# Patient Record
Sex: MALE | Race: WHITE | Employment: FULL TIME
[De-identification: names, ages, dates, MRNs, and addresses within clinical notes are randomized per-mention and may not be internally consistent; named-entity substitution may affect disease eponyms.]

---

## 2019-08-28 ENCOUNTER — HOSPITAL ENCOUNTER (OUTPATIENT)
Dept: MRI IMAGING | Facility: CLINIC | Age: 49
Discharge: HOME | End: 2019-08-30
Payer: COMMERCIAL

## 2019-08-28 DIAGNOSIS — T14.8XXA SPRAIN: ICD-10-CM

## 2019-08-28 PROCEDURE — 73721 MRI JNT OF LWR EXTRE W/O DYE: CPT

## 2023-01-11 ENCOUNTER — HOSPITAL ENCOUNTER (EMERGENCY)
Age: 53
Discharge: HOME | End: 2023-01-11
Attending: STUDENT IN AN ORGANIZED HEALTH CARE EDUCATION/TRAINING PROGRAM
Payer: COMMERCIAL

## 2023-01-11 ENCOUNTER — APPOINTMENT (OUTPATIENT)
Dept: GENERAL RADIOLOGY | Age: 53
End: 2023-01-11
Payer: COMMERCIAL

## 2023-01-11 VITALS
BODY MASS INDEX: 42.66 KG/M2 | OXYGEN SATURATION: 100 % | RESPIRATION RATE: 24 BRPM | SYSTOLIC BLOOD PRESSURE: 117 MMHG | HEIGHT: 72 IN | HEART RATE: 83 BPM | TEMPERATURE: 97.9 F | DIASTOLIC BLOOD PRESSURE: 66 MMHG | WEIGHT: 315 LBS

## 2023-01-11 DIAGNOSIS — D64.9 ANEMIA, UNSPECIFIED TYPE: ICD-10-CM

## 2023-01-11 DIAGNOSIS — R06.00 DYSPNEA, UNSPECIFIED TYPE: Primary | ICD-10-CM

## 2023-01-11 DIAGNOSIS — G25.81 RLS (RESTLESS LEGS SYNDROME): ICD-10-CM

## 2023-01-11 LAB
ABSOLUTE EOS #: 0.26 K/UL (ref 0–0.44)
ABSOLUTE IMMATURE GRANULOCYTE: 0.06 K/UL (ref 0–0.3)
ABSOLUTE LYMPH #: 1.4 K/UL (ref 1.1–3.7)
ABSOLUTE MONO #: 0.97 K/UL (ref 0.1–1.2)
ANION GAP SERPL CALCULATED.3IONS-SCNC: 16 MMOL/L (ref 9–17)
BASOPHILS # BLD: 0 % (ref 0–2)
BASOPHILS ABSOLUTE: 0.03 K/UL (ref 0–0.2)
BUN BLDV-MCNC: 21 MG/DL (ref 6–20)
BUN/CREAT BLD: 13 (ref 9–20)
CALCIUM SERPL-MCNC: 9.2 MG/DL (ref 8.6–10.4)
CHLORIDE BLD-SCNC: 93 MMOL/L (ref 98–107)
CO2: 20 MMOL/L (ref 20–31)
CREAT SERPL-MCNC: 1.62 MG/DL (ref 0.7–1.2)
DATE, STOOL #1: NORMAL
EKG ATRIAL RATE: 84 BPM
EKG P AXIS: 49 DEGREES
EKG P-R INTERVAL: 160 MS
EKG Q-T INTERVAL: 402 MS
EKG QRS DURATION: 86 MS
EKG QTC CALCULATION (BAZETT): 475 MS
EKG R AXIS: 43 DEGREES
EKG T AXIS: 58 DEGREES
EKG VENTRICULAR RATE: 84 BPM
EOSINOPHILS RELATIVE PERCENT: 2 % (ref 1–4)
GFR SERPL CREATININE-BSD FRML MDRD: 51 ML/MIN/1.73M2
GLUCOSE BLD-MCNC: 130 MG/DL (ref 70–99)
HCT VFR BLD CALC: 27.3 % (ref 40.7–50.3)
HEMOCCULT SP1 STL QL: NEGATIVE
HEMOGLOBIN: 8.8 G/DL (ref 13–17)
IMMATURE GRANULOCYTES: 1 %
LYMPHOCYTES # BLD: 13 % (ref 24–43)
MCH RBC QN AUTO: 31.3 PG (ref 25.2–33.5)
MCHC RBC AUTO-ENTMCNC: 32.2 G/DL (ref 28.4–34.8)
MCV RBC AUTO: 97.2 FL (ref 82.6–102.9)
MONOCYTES # BLD: 9 % (ref 3–12)
NRBC AUTOMATED: 0 PER 100 WBC
PDW BLD-RTO: 12.8 % (ref 11.8–14.4)
PLATELET # BLD: 125 K/UL (ref 138–453)
PMV BLD AUTO: 9.7 FL (ref 8.1–13.5)
POTASSIUM SERPL-SCNC: 3.5 MMOL/L (ref 3.7–5.3)
PRO-BNP: 1200 PG/ML
RBC # BLD: 2.81 M/UL (ref 4.21–5.77)
SEG NEUTROPHILS: 75 % (ref 36–65)
SEGMENTED NEUTROPHILS ABSOLUTE COUNT: 8.44 K/UL (ref 1.5–8.1)
SODIUM BLD-SCNC: 129 MMOL/L (ref 135–144)
TIME, STOOL #1: 215
TROPONIN, HIGH SENSITIVITY: 46 NG/L (ref 0–22)
TROPONIN, HIGH SENSITIVITY: 47 NG/L (ref 0–22)
WBC # BLD: 11.2 K/UL (ref 3.5–11.3)

## 2023-01-11 PROCEDURE — 99285 EMERGENCY DEPT VISIT HI MDM: CPT

## 2023-01-11 PROCEDURE — 84484 ASSAY OF TROPONIN QUANT: CPT

## 2023-01-11 PROCEDURE — 71045 X-RAY EXAM CHEST 1 VIEW: CPT

## 2023-01-11 PROCEDURE — 93005 ELECTROCARDIOGRAM TRACING: CPT | Performed by: STUDENT IN AN ORGANIZED HEALTH CARE EDUCATION/TRAINING PROGRAM

## 2023-01-11 PROCEDURE — 93010 ELECTROCARDIOGRAM REPORT: CPT | Performed by: INTERNAL MEDICINE

## 2023-01-11 PROCEDURE — 80048 BASIC METABOLIC PNL TOTAL CA: CPT

## 2023-01-11 PROCEDURE — 82270 OCCULT BLOOD FECES: CPT

## 2023-01-11 PROCEDURE — 83880 ASSAY OF NATRIURETIC PEPTIDE: CPT

## 2023-01-11 PROCEDURE — 85025 COMPLETE CBC W/AUTO DIFF WBC: CPT

## 2023-01-11 RX ORDER — DIAZEPAM 5 MG/1
5 TABLET ORAL EVERY 8 HOURS PRN
Qty: 10 TABLET | Refills: 0 | Status: SHIPPED | OUTPATIENT
Start: 2023-01-11 | End: 2023-01-16

## 2023-01-11 ASSESSMENT — PAIN - FUNCTIONAL ASSESSMENT: PAIN_FUNCTIONAL_ASSESSMENT: NONE - DENIES PAIN

## 2023-01-11 NOTE — DISCHARGE INSTRUCTIONS
As discussed please follow-up with your primary care doctor for your anemia, difficulty breathing as you may benefit from more cardiac work-up. Take your medication as indicated and prescribed. If you were given a prescription for prednisone or any other steroid then, take Pepcid (famotidine - over the counter) every day while you are taking the steroids. If you are a diabetic, you should check your blood sugar more frequently while taking prednisone. Use you use an inhaler or nebulizer or were given one to use, then use as prescribed, or at minimum every 4 hours while you are having shortness of breath. If you are given an antibiotic then, make sure you get the prescription filled and take the antibiotics until finished. Drink plenty of water while taking the antibiotics. Avoid drinking alcohol or drinks that have caffeine in it while taking antibiotics. For pain use acetaminophen (Tylenol) or ibuprofen (Motrin / Advil), unless prescribed medications that have acetaminophen or ibuprofen (or similar medications) in it. You can take over the counter acetaminophen tablets (1 - 2 tablets of the 500-mg strength every 6 hours) or ibuprofen tablets (2 tablets every 4 hours).     PLEASE RETURN TO THE EMERGENCY DEPARTMENT IMMEDIATELY for worsening symptoms of shortness of breath, wheezing, change in the amount of sputum that you cough up or a change in the color of your sputum, using your inhaler more frequently or if your inhaler only lasts up to 2 hours, or if you develop any concerning symptoms such as: high fever not relieved by acetaminophen (Tylenol) and/or ibuprofen (Motrin / Advil), chills, shortness of breath, chest pain, feeling of your heart fluttering or racing, persistent nausea and/or vomiting, vomiting up blood, blood in your stool, loss of consciousness, numbness, weakness or tingling in the arms or legs or change in color of the extremities, changes in mental status, persistent headache, blurry vision, loss of bladder / bowel control, unable to follow up with your physician, or other any other care or concern.

## 2023-01-11 NOTE — ED TRIAGE NOTES
Pt brought to 18 by EMS. Pt ambulated from EMS stretcher to cot. Pt co SOB, onset tonight. Pt states he felt like his throat was closing. Pt took nebulizer treatment at home prior to EMS arrival.  Pt denies pain at this time. Pt states he started taking gabapentin yesterday. IV access gained PTA. Pt placed on monitor. Pt respirations are even and unlabored, pt is alert and oriented X 4, speaking in complete sentences, bed is in the lowest position, call light is within reach. Will continue to monitor.

## 2023-01-11 NOTE — ED NOTES
Dr Hoda Rodrigues at bedside. Family at bedside. Pt respirations are even and unlabored, pt is alert and oriented X 4, speaking in complete sentences, bed is in the lowest position, call light is within reach. Will continue to monitor.      Gunnar Richey RN  01/11/23 0137

## 2023-01-11 NOTE — ED NOTES
Pt repositioned in bed. Family at bedside. Pt denies further needs at this time. Pt respirations are even and unlabored, pt is alert and oriented X 4, speaking in complete sentences, bed is in the lowest position, call light is within reach. Will continue to monitor.        Erum Matute RN  01/11/23 3105

## 2023-01-12 NOTE — ED PROVIDER NOTES
[facility]      Pt Name: Marcus Hernández  MRN: [id number]  [place] 1970  Date of evaluation: 1/12/23    CHIEF COMPLAINT       Chief Complaint   Patient presents with    Shortness of Breath       HISTORY OF PRESENT ILLNESS   Marcus Hernández is a 46 y.o. male who presents with shortness of breath worse with ambulation. Ongoing for the past day. Has long history of RLS and recently started gabapentin which he believes is at the root of his symptoms. Does endorse some lower extremity swelling. Mild. States he feels improved currently. PASTMEDICAL HISTORY     Past Medical History:   Diagnosis Date    Arthritis 7/2/2014    ED (erectile dysfunction) 7/2/2014    Essential hypertension, benign 7/2/2014    Unspecified asthma(493.90) 7/2/2014     Past Problem List  Patient Active Problem List   Diagnosis Code    Essential hypertension, benign I10    Asthma J45.909    ED (erectile dysfunction) N52.9    Arthritis M19.90       SURGICAL HISTORY     No past surgical history on file. CURRENT MEDICATIONS       Discharge Medication List as of 1/11/2023  3:35 AM        CONTINUE these medications which have NOT CHANGED    Details   verapamil (CALAN) 80 MG tablet TAKE ONE TABLET BY MOUTH TWICE A DAY, Disp-40 tablet, R-0Patient discharged from practice, will fill RX until 2-20-17. Normal      budesonide-formoterol (SYMBICORT) 160-4.5 MCG/ACT AERO Inhale 2 puffs into the lungs 2 times daily, Disp-1 Inhaler, R-5Normal      montelukast (SINGULAIR) 10 MG tablet Take 1 tablet by mouth nightly, Disp-30 tablet, R-5Normal      lisinopril (PRINIVIL;ZESTRIL) 20 MG tablet Take 1 tablet by mouth daily, Disp-30 tablet, R-3Normal      !! PROAIR  (90 BASE) MCG/ACT inhaler Inhale 2 puffs into the lungs every 6 hours as needed for Wheezing, Disp-1 Inhaler, R-3, DAWNormal      fluticasone (FLONASE) 50 MCG/ACT nasal spray 1 spray by Nasal route daily, Disp-1 Bottle, R-3Normal      !! albuterol sulfate HFA (VENTOLIN HFA) 108 (90 BASE) MCG/ACT inhaler Inhale 2 puffs into the lungs 4 times daily, Disp-18 g, R-0      ibuprofen (ADVIL;MOTRIN) 800 MG tablet Take 1 tablet by mouth every 6 hours as needed for Pain, Disp-60 tablet, R-0       !! - Potential duplicate medications found. Please discuss with provider. ALLERGIES     is allergic to doxycycline, naprosyn [naproxen], and augmentin [amoxicillin-pot clavulanate]. FAMILY HISTORY     He indicated that his mother is alive. He indicated that his father is alive. SOCIAL HISTORY       Social History     Tobacco Use    Smoking status: Never    Smokeless tobacco: Current   Substance Use Topics    Alcohol use: Yes     Comment: occasional    Drug use: No       PHYSICAL EXAM     INITIAL VITALS: /66   Pulse 83   Temp 97.9 °F (36.6 °C)   Resp 24   Ht 6' (1.829 m)   Wt (!) 380 lb (172.4 kg)   SpO2 100%   BMI 51.54 kg/m²    Physical Exam  Vitals and nursing note reviewed. Constitutional:       General: He is not in acute distress. Appearance: He is well-developed. He is obese. He is not toxic-appearing. HENT:      Head: Normocephalic and atraumatic. Nose: Nose normal.      Mouth/Throat:      Mouth: Mucous membranes are moist.   Eyes:      General: No scleral icterus. Conjunctiva/sclera: Conjunctivae normal.      Pupils: Pupils are equal, round, and reactive to light. Cardiovascular:      Rate and Rhythm: Normal rate and regular rhythm. Heart sounds: Normal heart sounds. No murmur heard. No friction rub. No gallop. Pulmonary:      Effort: No respiratory distress. Breath sounds: Normal breath sounds. No wheezing or rales. Comments: Mildly increased respiratory effort  Musculoskeletal:         General: Normal range of motion. Skin:     General: Skin is warm and dry. Findings: No erythema or rash. Neurological:      Mental Status: He is alert and oriented to person, place, and time.    Psychiatric: Behavior: Behavior normal.       MEDICAL DECISION MAKING / ED COURSE:   Summary of Patient Presentation:      1)  Number and Complexity of Problems  Problem List This Visit:    1. Dyspnea, unspecified type    2. Anemia, unspecified type    3. RLS (restless legs syndrome)        Differential Diagnosis: ACS versus musculoskeletal pain versus GERD    Diagnoses Considered but Do Not Suspect:  Acute Coronary Syndrome, Aortic Dissection, Pulmonary embolism, Pneumothorax, Pneumonia    Pertinent Comorbid Conditions:    Past Medical History:   Diagnosis Date    Arthritis 7/2/2014    ED (erectile dysfunction) 7/2/2014    Essential hypertension, benign 7/2/2014    Unspecified asthma(493.90) 7/2/2014       2)  Data Reviewed  My EKG interpretation: EKG Interpretation    Interpreted by me    Rhythm: normal sinus   Rate: normal  Axis: normal  Ectopy: none  Conduction: normal  ST Segments: no acute change  T Waves: no acute change  Q Waves: none    Clinical Impression: no acute changes and normal EKG      External Documents Reviewed: Previous ER visits reviewed by myself  3)  Treatment and Disposition  [Patient repeat assessment, Disposition discussion with patient/family, Case discussed with consulting clinician, MIPS, Social determinants of health impacting treatment or disposition, Shared Decision Making, Code Status Discussion:]    Normal EKG, mildly increased BNP, new cardiomegaly, anemia with slowly decreasing hemoglobins. Now 8.8. Hemodynamically stable and suspect patient would be stable for outpatient follow-up however did offer patient admission given multiple lab abnormalities most concerning Anthony the downtrending hemoglobin. States he does not want to be admitted and would like to follow-up outpatient. Encouraged him to call his primary care doctor soon as possible. Hemoccult negative. Given Valium to attempt another medication for RLS.     DATA FOR LAB AND RADIOLOGY TESTS ORDERED BELOW ARE REVIEWED BY THE ED CLINICIAN:    RADIOLOGY: All x-rays, CT, MRI, and formal ultrasound images (except ED bedside ultrasound) are read by the radiologist, see reports below, unless otherwise noted in MDM or here. Reports below are reviewed by myself. XR CHEST PORTABLE   Final Result   Mild cardiomegaly new since the 2016 exam.  Otherwise unremarkable exam.             LABS: Lab orders shown below, the results are reviewed by myself, and all abnormals are listed below. Labs Reviewed   BASIC METABOLIC PANEL - Abnormal; Notable for the following components:       Result Value    Glucose 130 (*)     BUN 21 (*)     Creatinine 1.62 (*)     Est, Glom Filt Rate 51 (*)     Sodium 129 (*)     Potassium 3.5 (*)     Chloride 93 (*)     All other components within normal limits   BRAIN NATRIURETIC PEPTIDE - Abnormal; Notable for the following components:    Pro-BNP 1,200 (*)     All other components within normal limits   CBC WITH AUTO DIFFERENTIAL - Abnormal; Notable for the following components:    RBC 2.81 (*)     Hemoglobin 8.8 (*)     Hematocrit 27.3 (*)     Platelets 178 (*)     Seg Neutrophils 75 (*)     Lymphocytes 13 (*)     Immature Granulocytes 1 (*)     Segs Absolute 8.44 (*)     All other components within normal limits   TROPONIN - Abnormal; Notable for the following components:    Troponin, High Sensitivity 46 (*)     All other components within normal limits   TROPONIN - Abnormal; Notable for the following components:    Troponin, High Sensitivity 47 (*)     All other components within normal limits   OCCULT BLOOD SCREEN       Vitals Reviewed:    Vitals:    01/11/23 0116 01/11/23 0121   BP:  117/66   Pulse: 83    Resp: 24    Temp:  97.9 °F (36.6 °C)   SpO2: 100%    Weight: (!) 380 lb (172.4 kg)    Height: 6' (1.829 m)      MEDICATIONS GIVEN TO PATIENT THIS ENCOUNTER:  Orders Placed This Encounter   Medications    diazePAM (VALIUM) 5 MG tablet     Sig: Take 1 tablet by mouth every 8 hours as needed for Anxiety for up to 5 days. Max Daily Amount: 15 mg     Dispense:  10 tablet     Refill:  0     DISCHARGE PRESCRIPTIONS:  Discharge Medication List as of 1/11/2023  3:35 AM        PHYSICIAN CONSULTS ORDERED THIS ENCOUNTER:  None    ED Course Notes From Epic Narrator:  ED Course as of 01/12/23 0609   Wed Jan 11, 2023   0208 Hemoglobin Quant(!): 8.8 [MS]   0208 Creatinine(!): 1.62 [MS]      ED Course User Index  [MS] Bruno Villarreal DO         CRITICAL CARE:   0    PROCEDURES:  none    FINAL IMPRESSION      1. Dyspnea, unspecified type    2. Anemia, unspecified type    3.  RLS (restless legs syndrome)          DISPOSITION/PLAN   DISPOSITION Decision To Discharge 01/11/2023 03:33:50 AM      OUTPATIENT FOLLOW UP THE PATIENT:  Ric Perla MD  [facility]  [phone]    Schedule an appointment as soon as possible for a visit in 1 week        DISCHARGE MEDICATIONS:  Discharge Medication List as of 1/11/2023  3:35 AM          Bruno Villarreal DO  EmergencyMedicine Attending    (Please note that portions of this note were completed with a voice recognition program.  Efforts were made to edit the dictations but occasionally words are mis-transcribed.)     Bruno Villarreal DO  01/12/23 6977

## 2023-02-20 ENCOUNTER — HOSPITAL ENCOUNTER (OUTPATIENT)
Dept: SLEEP CENTER | Age: 53
Discharge: HOME | End: 2023-02-22
Payer: COMMERCIAL

## 2023-02-20 VITALS — WEIGHT: 315 LBS | BODY MASS INDEX: 42.66 KG/M2 | HEIGHT: 72 IN

## 2023-02-20 PROCEDURE — 95810 POLYSOM 6/> YRS 4/> PARAM: CPT

## 2023-02-20 ASSESSMENT — SLEEP AND FATIGUE QUESTIONNAIRES: DO YOU SNORE: YES

## 2023-03-16 ENCOUNTER — HOSPITAL ENCOUNTER (OUTPATIENT)
Dept: SLEEP CENTER | Age: 53
Discharge: HOME | End: 2023-03-18
Payer: COMMERCIAL

## 2023-03-16 PROCEDURE — G0399 HOME SLEEP TEST/TYPE 3 PORTA: HCPCS

## 2023-03-28 LAB — STATUS: NORMAL

## 2023-05-02 ENCOUNTER — APPOINTMENT (OUTPATIENT)
Dept: ULTRASOUND IMAGING | Age: 53
DRG: 291 | End: 2023-05-02
Payer: COMMERCIAL

## 2023-05-02 ENCOUNTER — APPOINTMENT (OUTPATIENT)
Dept: GENERAL RADIOLOGY | Age: 53
DRG: 291 | End: 2023-05-02
Payer: COMMERCIAL

## 2023-05-02 ENCOUNTER — HOSPITAL ENCOUNTER (INPATIENT)
Age: 53
LOS: 6 days | Discharge: HOME HEALTH | DRG: 291 | End: 2023-05-08
Attending: EMERGENCY MEDICINE | Admitting: INTERNAL MEDICINE
Payer: COMMERCIAL

## 2023-05-02 ENCOUNTER — APPOINTMENT (OUTPATIENT)
Dept: CT IMAGING | Age: 53
DRG: 291 | End: 2023-05-02
Payer: COMMERCIAL

## 2023-05-02 DIAGNOSIS — J44.1 COPD EXACERBATION (HCC): ICD-10-CM

## 2023-05-02 DIAGNOSIS — I50.9 ACUTE ON CHRONIC CONGESTIVE HEART FAILURE, UNSPECIFIED HEART FAILURE TYPE (HCC): Primary | ICD-10-CM

## 2023-05-02 DIAGNOSIS — L03.818 CELLULITIS OF OTHER SPECIFIED SITE: ICD-10-CM

## 2023-05-02 PROBLEM — L03.314 CELLULITIS OF GROIN: Status: ACTIVE | Noted: 2023-05-02

## 2023-05-02 PROBLEM — D63.1 ANEMIA DUE TO STAGE 3 CHRONIC KIDNEY DISEASE (HCC): Chronic | Status: ACTIVE | Noted: 2023-05-02

## 2023-05-02 PROBLEM — G47.33 OBSTRUCTIVE SLEEP APNEA: Status: ACTIVE | Noted: 2020-09-21

## 2023-05-02 PROBLEM — E66.813 CLASS 3 OBESITY WITH ALVEOLAR HYPOVENTILATION AND BODY MASS INDEX (BMI) OF 50.0 TO 59.9 IN ADULT: Status: ACTIVE | Noted: 2023-05-02

## 2023-05-02 PROBLEM — N18.30 ANEMIA DUE TO STAGE 3 CHRONIC KIDNEY DISEASE (HCC): Chronic | Status: ACTIVE | Noted: 2023-05-02

## 2023-05-02 PROBLEM — E66.01 MORBID OBESITY (HCC): Status: ACTIVE | Noted: 2023-05-02

## 2023-05-02 PROBLEM — N17.9 ACUTE-ON-CHRONIC KIDNEY INJURY (HCC): Status: ACTIVE | Noted: 2023-04-09

## 2023-05-02 PROBLEM — F10.10 ALCOHOL ABUSE: Chronic | Status: ACTIVE | Noted: 2023-05-02

## 2023-05-02 PROBLEM — E11.42 POLYNEUROPATHY DUE TO TYPE 2 DIABETES MELLITUS (HCC): Status: ACTIVE | Noted: 2022-06-12

## 2023-05-02 PROBLEM — N18.9 ACUTE-ON-CHRONIC KIDNEY INJURY (HCC): Status: ACTIVE | Noted: 2023-04-09

## 2023-05-02 PROBLEM — N49.2 CELLULITIS OF SCROTUM: Status: ACTIVE | Noted: 2023-05-02

## 2023-05-02 PROBLEM — K76.6 PORTAL VENOUS HYPERTENSION (HCC): Status: ACTIVE | Noted: 2023-05-02

## 2023-05-02 PROBLEM — E66.2 CLASS 3 OBESITY WITH ALVEOLAR HYPOVENTILATION AND BODY MASS INDEX (BMI) OF 50.0 TO 59.9 IN ADULT (HCC): Status: ACTIVE | Noted: 2023-05-02

## 2023-05-02 PROBLEM — I10 HYPERTENSION: Status: ACTIVE | Noted: 2023-05-02

## 2023-05-02 LAB
ABSOLUTE EOS #: 0.19 K/UL (ref 0–0.44)
ABSOLUTE IMMATURE GRANULOCYTE: 0.09 K/UL (ref 0–0.3)
ABSOLUTE LYMPH #: 0.56 K/UL (ref 1.1–3.7)
ABSOLUTE MONO #: 0.74 K/UL (ref 0.1–1.2)
ABSOLUTE RETIC #: 0.07 M/UL (ref 0.03–0.08)
ALBUMIN SERPL-MCNC: 3.8 G/DL (ref 3.5–5.2)
ALP SERPL-CCNC: 157 U/L (ref 40–129)
ALT SERPL-CCNC: 12 U/L (ref 5–41)
AMMONIA PLAS-SCNC: 71 UMOL/L (ref 16–60)
ANION GAP SERPL CALCULATED.3IONS-SCNC: 16 MMOL/L (ref 9–17)
AST SERPL-CCNC: 21 U/L
BASOPHILS # BLD: 0 % (ref 0–2)
BASOPHILS ABSOLUTE: 0 K/UL (ref 0–0.2)
BILIRUB DIRECT SERPL-MCNC: 0.6 MG/DL
BILIRUB INDIRECT SERPL-MCNC: 0.7 MG/DL (ref 0–1)
BILIRUB SERPL-MCNC: 1.3 MG/DL (ref 0.3–1.2)
BILIRUBIN URINE: NEGATIVE
BNP SERPL-MCNC: 5998 PG/ML
BUN SERPL-MCNC: 51 MG/DL (ref 6–20)
BUN/CREAT BLD: 26 (ref 9–20)
CALCIUM SERPL-MCNC: 9.4 MG/DL (ref 8.6–10.4)
CHLORIDE SERPL-SCNC: 95 MMOL/L (ref 98–107)
CO2 SERPL-SCNC: 22 MMOL/L (ref 20–31)
COLOR: YELLOW
CREAT SERPL-MCNC: 1.95 MG/DL (ref 0.7–1.2)
CRP SERPL HS-MCNC: 68.1 MG/L (ref 0–5)
EKG ATRIAL RATE: 90 BPM
EKG P AXIS: 68 DEGREES
EKG P-R INTERVAL: 164 MS
EKG Q-T INTERVAL: 380 MS
EKG QRS DURATION: 86 MS
EKG QTC CALCULATION (BAZETT): 464 MS
EKG R AXIS: 28 DEGREES
EKG T AXIS: 35 DEGREES
EKG VENTRICULAR RATE: 90 BPM
EOSINOPHILS RELATIVE PERCENT: 2 % (ref 1–4)
EPITHELIAL CELLS UA: ABNORMAL /HPF (ref 0–5)
ERYTHROCYTE [SEDIMENTATION RATE] IN BLOOD BY WESTERGREN METHOD: 78 MM/HR (ref 0–20)
EST. AVERAGE GLUCOSE BLD GHB EST-MCNC: 123 MG/DL
FERRITIN SERPL-MCNC: 91 NG/ML (ref 30–400)
FIO2: 3
FOLATE SERPL-MCNC: 13.5 NG/ML
GFR SERPL CREATININE-BSD FRML MDRD: 41 ML/MIN/1.73M2
GLUCOSE BLD-MCNC: 152 MG/DL (ref 75–110)
GLUCOSE BLD-MCNC: 206 MG/DL (ref 75–110)
GLUCOSE BLD-MCNC: 89 MG/DL (ref 75–110)
GLUCOSE SERPL-MCNC: 157 MG/DL (ref 70–99)
GLUCOSE UR STRIP.AUTO-MCNC: NEGATIVE MG/DL
HBA1C MFR BLD: 5.9 % (ref 4–6)
HCO3 VENOUS: 24.1 MMOL/L (ref 22–29)
HCT VFR BLD AUTO: 24.2 % (ref 40.7–50.3)
HGB BLD-MCNC: 7.4 G/DL (ref 13–17)
IMMATURE GRANULOCYTES: 1 %
IMMATURE RETIC FRACT: 32.3 % (ref 2.7–18.3)
IRON SATURATION: 10 % (ref 20–55)
IRON SERPL-MCNC: 28 UG/DL (ref 59–158)
KETONES UR STRIP.AUTO-MCNC: NEGATIVE MG/DL
LACTIC ACID, SEPSIS: 1.2 MMOL/L (ref 0.5–1.9)
LACTIC ACID, SEPSIS: 2.4 MMOL/L (ref 0.5–1.9)
LEUKOCYTE ESTERASE UR QL STRIP.AUTO: ABNORMAL
LYMPHOCYTES # BLD: 6 % (ref 24–43)
MCH RBC QN AUTO: 29 PG (ref 25.2–33.5)
MCHC RBC AUTO-ENTMCNC: 30.6 G/DL (ref 28.4–34.8)
MCV RBC AUTO: 94.9 FL (ref 82.6–102.9)
MONOCYTES # BLD: 8 % (ref 3–12)
MYOGLOBIN SERPL-MCNC: 177 NG/ML (ref 28–72)
NEGATIVE BASE EXCESS, ART: 1 (ref 0–2)
NEGATIVE BASE EXCESS, VEN: 2 (ref 0–2)
NITRITE UR QL STRIP.AUTO: NEGATIVE
NRBC AUTOMATED: 0 PER 100 WBC
O2 DEVICE/FLOW/%: NORMAL
O2 SAT, VEN: 94 % (ref 60–85)
PCO2, VEN: 44.4 MM HG (ref 41–51)
PDW BLD-RTO: 13.8 % (ref 11.8–14.4)
PH VENOUS: 7.34 (ref 7.32–7.43)
PLATELET # BLD AUTO: 138 K/UL (ref 138–453)
PMV BLD AUTO: 9.9 FL (ref 8.1–13.5)
PO2, VEN: 73.7 MM HG (ref 30–50)
POC HCO3: 23.7 MMOL/L (ref 21–28)
POC O2 SATURATION: 96 % (ref 94–98)
POC PCO2: 39.7 MM HG (ref 35–48)
POC PH: 7.38 (ref 7.35–7.45)
POC PO2: 87.3 MM HG (ref 83–108)
POTASSIUM SERPL-SCNC: 4.1 MMOL/L (ref 3.7–5.3)
PROT SERPL-MCNC: 7.8 G/DL (ref 6.4–8.3)
PROT UR STRIP.AUTO-MCNC: 5.5 MG/DL (ref 5–8)
PROT UR STRIP.AUTO-MCNC: ABNORMAL MG/DL
RBC # BLD: 2.55 M/UL (ref 4.21–5.77)
RBC CLUMPS #/AREA URNS AUTO: ABNORMAL /HPF (ref 0–2)
REASON FOR REJECTION: NORMAL
RETIC HEMOGLOBIN: 25.9 PG (ref 28.2–35.7)
RETICS/RBC NFR AUTO: 2.7 % (ref 0.5–1.9)
SARS-COV-2 RDRP RESP QL NAA+PROBE: NOT DETECTED
SEG NEUTROPHILS: 83 % (ref 36–65)
SEGMENTED NEUTROPHILS ABSOLUTE COUNT: 7.72 K/UL (ref 1.5–8.1)
SODIUM SERPL-SCNC: 133 MMOL/L (ref 135–144)
SPECIFIC GRAVITY UA: 1.02 (ref 1–1.03)
SPECIMEN DESCRIPTION: NORMAL
T3FREE SERPL-MCNC: 2.66 PG/ML (ref 2.02–4.43)
T4 FREE SERPL-MCNC: 1.2 NG/DL (ref 0.9–1.7)
TIBC SERPL-MCNC: 293 UG/DL (ref 250–450)
TROPONIN I SERPL DL<=0.01 NG/ML-MCNC: 61 NG/L (ref 0–22)
TROPONIN I SERPL DL<=0.01 NG/ML-MCNC: 63 NG/L (ref 0–22)
TSH SERPL-ACNC: 7.18 UIU/ML (ref 0.3–5)
TURBIDITY: ABNORMAL
UNSATURATED IRON BINDING CAPACITY: 265 UG/DL (ref 112–347)
URINE HGB: ABNORMAL
UROBILINOGEN, URINE: NORMAL
VIT B12 SERPL-MCNC: 1101 PG/ML (ref 232–1245)
WBC # BLD AUTO: 9.3 K/UL (ref 3.5–11.3)
WBC UA: ABNORMAL /HPF (ref 0–5)
YEAST: ABNORMAL
ZZ NTE CLEAN UP: ORDERED TEST: NORMAL
ZZ NTE WITH NAME CLEAN UP: SPECIMEN SOURCE: NORMAL

## 2023-05-02 PROCEDURE — 82746 ASSAY OF FOLIC ACID SERUM: CPT

## 2023-05-02 PROCEDURE — 51702 INSERT TEMP BLADDER CATH: CPT

## 2023-05-02 PROCEDURE — 6360000002 HC RX W HCPCS

## 2023-05-02 PROCEDURE — 2580000003 HC RX 258: Performed by: NURSE PRACTITIONER

## 2023-05-02 PROCEDURE — 6370000000 HC RX 637 (ALT 250 FOR IP): Performed by: UROLOGY

## 2023-05-02 PROCEDURE — 36600 WITHDRAWAL OF ARTERIAL BLOOD: CPT

## 2023-05-02 PROCEDURE — 86160 COMPLEMENT ANTIGEN: CPT

## 2023-05-02 PROCEDURE — 2500000003 HC RX 250 WO HCPCS: Performed by: NURSE PRACTITIONER

## 2023-05-02 PROCEDURE — 86334 IMMUNOFIX E-PHORESIS SERUM: CPT

## 2023-05-02 PROCEDURE — 2000000000 HC ICU R&B

## 2023-05-02 PROCEDURE — 94640 AIRWAY INHALATION TREATMENT: CPT

## 2023-05-02 PROCEDURE — 84155 ASSAY OF PROTEIN SERUM: CPT

## 2023-05-02 PROCEDURE — 82607 VITAMIN B-12: CPT

## 2023-05-02 PROCEDURE — 80074 ACUTE HEPATITIS PANEL: CPT

## 2023-05-02 PROCEDURE — 6370000000 HC RX 637 (ALT 250 FOR IP): Performed by: NURSE PRACTITIONER

## 2023-05-02 PROCEDURE — 84439 ASSAY OF FREE THYROXINE: CPT

## 2023-05-02 PROCEDURE — 84443 ASSAY THYROID STIM HORMONE: CPT

## 2023-05-02 PROCEDURE — 84165 PROTEIN E-PHORESIS SERUM: CPT

## 2023-05-02 PROCEDURE — 86038 ANTINUCLEAR ANTIBODIES: CPT

## 2023-05-02 PROCEDURE — 84484 ASSAY OF TROPONIN QUANT: CPT

## 2023-05-02 PROCEDURE — 83036 HEMOGLOBIN GLYCOSYLATED A1C: CPT

## 2023-05-02 PROCEDURE — 83880 ASSAY OF NATRIURETIC PEPTIDE: CPT

## 2023-05-02 PROCEDURE — 6360000002 HC RX W HCPCS: Performed by: NURSE PRACTITIONER

## 2023-05-02 PROCEDURE — 76705 ECHO EXAM OF ABDOMEN: CPT

## 2023-05-02 PROCEDURE — 82947 ASSAY GLUCOSE BLOOD QUANT: CPT

## 2023-05-02 PROCEDURE — 82728 ASSAY OF FERRITIN: CPT

## 2023-05-02 PROCEDURE — 94761 N-INVAS EAR/PLS OXIMETRY MLT: CPT

## 2023-05-02 PROCEDURE — 83521 IG LIGHT CHAINS FREE EACH: CPT

## 2023-05-02 PROCEDURE — 85652 RBC SED RATE AUTOMATED: CPT

## 2023-05-02 PROCEDURE — 85045 AUTOMATED RETICULOCYTE COUNT: CPT

## 2023-05-02 PROCEDURE — 80048 BASIC METABOLIC PNL TOTAL CA: CPT

## 2023-05-02 PROCEDURE — 93005 ELECTROCARDIOGRAM TRACING: CPT | Performed by: EMERGENCY MEDICINE

## 2023-05-02 PROCEDURE — 81001 URINALYSIS AUTO W/SCOPE: CPT

## 2023-05-02 PROCEDURE — 83874 ASSAY OF MYOGLOBIN: CPT

## 2023-05-02 PROCEDURE — 99223 1ST HOSP IP/OBS HIGH 75: CPT | Performed by: NURSE PRACTITIONER

## 2023-05-02 PROCEDURE — 76770 US EXAM ABDO BACK WALL COMP: CPT

## 2023-05-02 PROCEDURE — 2500000003 HC RX 250 WO HCPCS: Performed by: EMERGENCY MEDICINE

## 2023-05-02 PROCEDURE — 82140 ASSAY OF AMMONIA: CPT

## 2023-05-02 PROCEDURE — 80076 HEPATIC FUNCTION PANEL: CPT

## 2023-05-02 PROCEDURE — 36415 COLL VENOUS BLD VENIPUNCTURE: CPT

## 2023-05-02 PROCEDURE — 83540 ASSAY OF IRON: CPT

## 2023-05-02 PROCEDURE — 86140 C-REACTIVE PROTEIN: CPT

## 2023-05-02 PROCEDURE — 2700000000 HC OXYGEN THERAPY PER DAY

## 2023-05-02 PROCEDURE — 2580000003 HC RX 258

## 2023-05-02 PROCEDURE — 84481 FREE ASSAY (FT-3): CPT

## 2023-05-02 PROCEDURE — 83550 IRON BINDING TEST: CPT

## 2023-05-02 PROCEDURE — 83605 ASSAY OF LACTIC ACID: CPT

## 2023-05-02 PROCEDURE — 96374 THER/PROPH/DIAG INJ IV PUSH: CPT

## 2023-05-02 PROCEDURE — 71045 X-RAY EXAM CHEST 1 VIEW: CPT

## 2023-05-02 PROCEDURE — 99285 EMERGENCY DEPT VISIT HI MDM: CPT

## 2023-05-02 PROCEDURE — 86225 DNA ANTIBODY NATIVE: CPT

## 2023-05-02 PROCEDURE — 71250 CT THORAX DX C-: CPT

## 2023-05-02 PROCEDURE — 87635 SARS-COV-2 COVID-19 AMP PRB: CPT

## 2023-05-02 PROCEDURE — 82803 BLOOD GASES ANY COMBINATION: CPT

## 2023-05-02 PROCEDURE — 85025 COMPLETE CBC W/AUTO DIFF WBC: CPT

## 2023-05-02 PROCEDURE — 83516 IMMUNOASSAY NONANTIBODY: CPT

## 2023-05-02 PROCEDURE — 51798 US URINE CAPACITY MEASURE: CPT

## 2023-05-02 RX ORDER — GLIMEPIRIDE 4 MG/1
TABLET ORAL
COMMUNITY

## 2023-05-02 RX ORDER — VERAPAMIL HYDROCHLORIDE 80 MG/1
80 TABLET ORAL DAILY
Status: DISCONTINUED | OUTPATIENT
Start: 2023-05-03 | End: 2023-05-08

## 2023-05-02 RX ORDER — ROPINIROLE 4 MG/1
4 TABLET, FILM COATED ORAL NIGHTLY PRN
COMMUNITY

## 2023-05-02 RX ORDER — IPRATROPIUM BROMIDE AND ALBUTEROL SULFATE 2.5; .5 MG/3ML; MG/3ML
1 SOLUTION RESPIRATORY (INHALATION)
Status: DISCONTINUED | OUTPATIENT
Start: 2023-05-02 | End: 2023-05-04

## 2023-05-02 RX ORDER — LIDOCAINE HYDROCHLORIDE 20 MG/ML
JELLY TOPICAL ONCE
Status: COMPLETED | OUTPATIENT
Start: 2023-05-02 | End: 2023-05-02

## 2023-05-02 RX ORDER — POTASSIUM CHLORIDE 20 MEQ/1
20 TABLET, EXTENDED RELEASE ORAL DAILY
Status: ON HOLD | COMMUNITY
End: 2023-05-08 | Stop reason: HOSPADM

## 2023-05-02 RX ORDER — SODIUM CHLORIDE FOR INHALATION 0.9 %
3 VIAL, NEBULIZER (ML) INHALATION EVERY 4 HOURS PRN
Status: DISCONTINUED | OUTPATIENT
Start: 2023-05-02 | End: 2023-05-08 | Stop reason: HOSPADM

## 2023-05-02 RX ORDER — INSULIN LISPRO 100 [IU]/ML
0-4 INJECTION, SOLUTION INTRAVENOUS; SUBCUTANEOUS
Status: DISCONTINUED | OUTPATIENT
Start: 2023-05-02 | End: 2023-05-03

## 2023-05-02 RX ORDER — GLIPIZIDE 10 MG/1
10 TABLET ORAL
Status: DISCONTINUED | OUTPATIENT
Start: 2023-05-03 | End: 2023-05-04

## 2023-05-02 RX ORDER — ONDANSETRON 2 MG/ML
4 INJECTION INTRAMUSCULAR; INTRAVENOUS EVERY 6 HOURS PRN
Status: DISCONTINUED | OUTPATIENT
Start: 2023-05-02 | End: 2023-05-08 | Stop reason: HOSPADM

## 2023-05-02 RX ORDER — DEXAMETHASONE SODIUM PHOSPHATE 10 MG/ML
10 INJECTION, SOLUTION INTRAMUSCULAR; INTRAVENOUS ONCE
Status: COMPLETED | OUTPATIENT
Start: 2023-05-02 | End: 2023-05-02

## 2023-05-02 RX ORDER — BUMETANIDE 0.25 MG/ML
2 INJECTION INTRAMUSCULAR; INTRAVENOUS 2 TIMES DAILY
Status: DISCONTINUED | OUTPATIENT
Start: 2023-05-02 | End: 2023-05-02

## 2023-05-02 RX ORDER — ALBUTEROL SULFATE 90 UG/1
2 AEROSOL, METERED RESPIRATORY (INHALATION) EVERY 4 HOURS PRN
Status: DISCONTINUED | OUTPATIENT
Start: 2023-05-02 | End: 2023-05-08 | Stop reason: HOSPADM

## 2023-05-02 RX ORDER — ALBUTEROL SULFATE 90 UG/1
2 AEROSOL, METERED RESPIRATORY (INHALATION) 4 TIMES DAILY
Status: DISCONTINUED | OUTPATIENT
Start: 2023-05-02 | End: 2023-05-02

## 2023-05-02 RX ORDER — MONTELUKAST SODIUM 10 MG/1
10 TABLET ORAL NIGHTLY
Status: DISCONTINUED | OUTPATIENT
Start: 2023-05-02 | End: 2023-05-08 | Stop reason: HOSPADM

## 2023-05-02 RX ORDER — FUROSEMIDE 10 MG/ML
40 INJECTION INTRAMUSCULAR; INTRAVENOUS 2 TIMES DAILY
Status: DISCONTINUED | OUTPATIENT
Start: 2023-05-02 | End: 2023-05-02

## 2023-05-02 RX ORDER — INSULIN LISPRO 100 [IU]/ML
0-4 INJECTION, SOLUTION INTRAVENOUS; SUBCUTANEOUS NIGHTLY
Status: DISCONTINUED | OUTPATIENT
Start: 2023-05-02 | End: 2023-05-03

## 2023-05-02 RX ORDER — LORAZEPAM 0.5 MG/1
0.5 TABLET ORAL 2 TIMES DAILY PRN
Status: DISCONTINUED | OUTPATIENT
Start: 2023-05-02 | End: 2023-05-08 | Stop reason: HOSPADM

## 2023-05-02 RX ORDER — BUMETANIDE 2 MG/1
4 TABLET ORAL 2 TIMES DAILY
Status: ON HOLD | COMMUNITY
End: 2023-05-08 | Stop reason: HOSPADM

## 2023-05-02 RX ORDER — DEXTROSE MONOHYDRATE 100 MG/ML
INJECTION, SOLUTION INTRAVENOUS CONTINUOUS PRN
Status: DISCONTINUED | OUTPATIENT
Start: 2023-05-02 | End: 2023-05-08 | Stop reason: HOSPADM

## 2023-05-02 RX ORDER — PANTOPRAZOLE SODIUM 40 MG/1
40 TABLET, DELAYED RELEASE ORAL DAILY
COMMUNITY

## 2023-05-02 RX ORDER — ASPIRIN 81 MG/1
81 TABLET ORAL EVERY MORNING
COMMUNITY

## 2023-05-02 RX ORDER — ROPINIROLE 2 MG/1
4 TABLET, FILM COATED ORAL NIGHTLY PRN
Status: DISCONTINUED | OUTPATIENT
Start: 2023-05-02 | End: 2023-05-03

## 2023-05-02 RX ORDER — POTASSIUM CHLORIDE 20 MEQ/1
40 TABLET, EXTENDED RELEASE ORAL PRN
Status: DISCONTINUED | OUTPATIENT
Start: 2023-05-02 | End: 2023-05-08 | Stop reason: HOSPADM

## 2023-05-02 RX ORDER — ONDANSETRON 4 MG/1
4 TABLET, ORALLY DISINTEGRATING ORAL EVERY 8 HOURS PRN
Status: DISCONTINUED | OUTPATIENT
Start: 2023-05-02 | End: 2023-05-08 | Stop reason: HOSPADM

## 2023-05-02 RX ORDER — FUROSEMIDE 10 MG/ML
80 INJECTION INTRAMUSCULAR; INTRAVENOUS 2 TIMES DAILY
Status: DISCONTINUED | OUTPATIENT
Start: 2023-05-02 | End: 2023-05-02 | Stop reason: DRUGHIGH

## 2023-05-02 RX ORDER — POLYETHYLENE GLYCOL 3350 17 G/17G
17 POWDER, FOR SOLUTION ORAL DAILY PRN
Status: DISCONTINUED | OUTPATIENT
Start: 2023-05-02 | End: 2023-05-05

## 2023-05-02 RX ORDER — MAGNESIUM SULFATE IN WATER 40 MG/ML
2000 INJECTION, SOLUTION INTRAVENOUS PRN
Status: DISCONTINUED | OUTPATIENT
Start: 2023-05-02 | End: 2023-05-08 | Stop reason: HOSPADM

## 2023-05-02 RX ORDER — LANOLIN ALCOHOL/MO/W.PET/CERES
6 CREAM (GRAM) TOPICAL NIGHTLY PRN
Status: DISCONTINUED | OUTPATIENT
Start: 2023-05-02 | End: 2023-05-04

## 2023-05-02 RX ORDER — VERAPAMIL HYDROCHLORIDE 80 MG/1
80 TABLET ORAL DAILY
Status: ON HOLD | COMMUNITY
End: 2023-05-08 | Stop reason: HOSPADM

## 2023-05-02 RX ORDER — LANOLIN ALCOHOL/MO/W.PET/CERES
325 CREAM (GRAM) TOPICAL
COMMUNITY

## 2023-05-02 RX ORDER — BUMETANIDE 0.25 MG/ML
1 INJECTION INTRAMUSCULAR; INTRAVENOUS ONCE
Status: COMPLETED | OUTPATIENT
Start: 2023-05-02 | End: 2023-05-02

## 2023-05-02 RX ORDER — LORAZEPAM 0.5 MG/1
0.5 TABLET ORAL 2 TIMES DAILY PRN
COMMUNITY

## 2023-05-02 RX ORDER — LACTULOSE 10 G/15ML
20 SOLUTION ORAL 3 TIMES DAILY
Status: DISCONTINUED | OUTPATIENT
Start: 2023-05-02 | End: 2023-05-02

## 2023-05-02 RX ORDER — HEPARIN SODIUM 5000 [USP'U]/ML
5000 INJECTION, SOLUTION INTRAVENOUS; SUBCUTANEOUS EVERY 8 HOURS SCHEDULED
Status: DISCONTINUED | OUTPATIENT
Start: 2023-05-02 | End: 2023-05-08 | Stop reason: HOSPADM

## 2023-05-02 RX ORDER — FLUTICASONE PROPIONATE 50 MCG
1 SPRAY, SUSPENSION (ML) NASAL DAILY
Status: DISCONTINUED | OUTPATIENT
Start: 2023-05-03 | End: 2023-05-08 | Stop reason: HOSPADM

## 2023-05-02 RX ORDER — LANOLIN ALCOHOL/MO/W.PET/CERES
6 CREAM (GRAM) TOPICAL NIGHTLY PRN
Status: DISCONTINUED | OUTPATIENT
Start: 2023-05-03 | End: 2023-05-02

## 2023-05-02 RX ORDER — DEXAMETHASONE SODIUM PHOSPHATE 10 MG/ML
6 INJECTION, SOLUTION INTRAMUSCULAR; INTRAVENOUS EVERY 6 HOURS
Status: DISCONTINUED | OUTPATIENT
Start: 2023-05-03 | End: 2023-05-02

## 2023-05-02 RX ORDER — METHYLPREDNISOLONE SODIUM SUCCINATE 125 MG/2ML
125 INJECTION, POWDER, LYOPHILIZED, FOR SOLUTION INTRAMUSCULAR; INTRAVENOUS ONCE
Status: COMPLETED | OUTPATIENT
Start: 2023-05-02 | End: 2023-05-02

## 2023-05-02 RX ORDER — BUDESONIDE AND FORMOTEROL FUMARATE DIHYDRATE 160; 4.5 UG/1; UG/1
2 AEROSOL RESPIRATORY (INHALATION) 2 TIMES DAILY
Status: DISCONTINUED | OUTPATIENT
Start: 2023-05-02 | End: 2023-05-08 | Stop reason: HOSPADM

## 2023-05-02 RX ORDER — ASPIRIN 81 MG/1
81 TABLET ORAL EVERY MORNING
Status: DISCONTINUED | OUTPATIENT
Start: 2023-05-03 | End: 2023-05-08 | Stop reason: HOSPADM

## 2023-05-02 RX ORDER — CARVEDILOL 3.12 MG/1
3.12 TABLET ORAL 2 TIMES DAILY WITH MEALS
Status: DISCONTINUED | OUTPATIENT
Start: 2023-05-02 | End: 2023-05-02

## 2023-05-02 RX ORDER — POTASSIUM CHLORIDE 7.45 MG/ML
10 INJECTION INTRAVENOUS PRN
Status: DISCONTINUED | OUTPATIENT
Start: 2023-05-02 | End: 2023-05-08 | Stop reason: HOSPADM

## 2023-05-02 RX ORDER — LANOLIN ALCOHOL/MO/W.PET/CERES
325 CREAM (GRAM) TOPICAL
Status: DISCONTINUED | OUTPATIENT
Start: 2023-05-03 | End: 2023-05-08 | Stop reason: HOSPADM

## 2023-05-02 RX ADMIN — DEXAMETHASONE SODIUM PHOSPHATE 10 MG: 10 INJECTION, SOLUTION INTRAMUSCULAR; INTRAVENOUS at 21:23

## 2023-05-02 RX ADMIN — ROPINIROLE HYDROCHLORIDE 4 MG: 2 TABLET, FILM COATED ORAL at 20:46

## 2023-05-02 RX ADMIN — IPRATROPIUM BROMIDE AND ALBUTEROL SULFATE 1 AMPULE: 2.5; .5 SOLUTION RESPIRATORY (INHALATION) at 19:29

## 2023-05-02 RX ADMIN — HEPARIN SODIUM 5000 UNITS: 5000 INJECTION INTRAVENOUS; SUBCUTANEOUS at 15:16

## 2023-05-02 RX ADMIN — LORAZEPAM 0.5 MG: 0.5 TABLET ORAL at 17:03

## 2023-05-02 RX ADMIN — BUMETANIDE 1 MG: 0.25 INJECTION INTRAMUSCULAR; INTRAVENOUS at 10:07

## 2023-05-02 RX ADMIN — RACEPINEPHRINE HYDROCHLORIDE 11.25 MG: 11.25 SOLUTION RESPIRATORY (INHALATION) at 21:30

## 2023-05-02 RX ADMIN — CEFTRIAXONE SODIUM 1000 MG: 1 INJECTION, POWDER, FOR SOLUTION INTRAMUSCULAR; INTRAVENOUS at 17:57

## 2023-05-02 RX ADMIN — FUROSEMIDE 10 MG/HR: 10 INJECTION, SOLUTION INTRAMUSCULAR; INTRAVENOUS at 18:38

## 2023-05-02 RX ADMIN — AZITHROMYCIN MONOHYDRATE 500 MG: 500 INJECTION, POWDER, LYOPHILIZED, FOR SOLUTION INTRAVENOUS at 15:14

## 2023-05-02 RX ADMIN — HEPARIN SODIUM 5000 UNITS: 5000 INJECTION INTRAVENOUS; SUBCUTANEOUS at 22:17

## 2023-05-02 RX ADMIN — BUDESONIDE AND FORMOTEROL FUMARATE DIHYDRATE 2 PUFF: 160; 4.5 AEROSOL RESPIRATORY (INHALATION) at 19:30

## 2023-05-02 RX ADMIN — LIDOCAINE HYDROCHLORIDE: 20 JELLY TOPICAL at 18:40

## 2023-05-02 RX ADMIN — FOLIC ACID: 5 INJECTION, SOLUTION INTRAMUSCULAR; INTRAVENOUS; SUBCUTANEOUS at 14:22

## 2023-05-02 RX ADMIN — MONTELUKAST 10 MG: 10 TABLET, FILM COATED ORAL at 20:26

## 2023-05-02 RX ADMIN — DEXMEDETOMIDINE 0.2 MCG/KG/HR: 100 INJECTION, SOLUTION INTRAVENOUS at 21:15

## 2023-05-02 RX ADMIN — WATER 40 MG: 1 INJECTION INTRAMUSCULAR; INTRAVENOUS; SUBCUTANEOUS at 20:24

## 2023-05-02 RX ADMIN — METHYLPREDNISOLONE SODIUM SUCCINATE 125 MG: 125 INJECTION, POWDER, FOR SOLUTION INTRAMUSCULAR; INTRAVENOUS at 14:29

## 2023-05-02 RX ADMIN — FUROSEMIDE 80 MG: 10 INJECTION, SOLUTION INTRAMUSCULAR; INTRAVENOUS at 15:10

## 2023-05-02 RX ADMIN — IPRATROPIUM BROMIDE AND ALBUTEROL SULFATE 1 AMPULE: 2.5; .5 SOLUTION RESPIRATORY (INHALATION) at 15:44

## 2023-05-02 RX ADMIN — Medication 6 MG: at 23:53

## 2023-05-02 RX ADMIN — ANTI-FUNGAL POWDER MICONAZOLE NITRATE TALC FREE: 1.42 POWDER TOPICAL at 20:46

## 2023-05-02 ASSESSMENT — PAIN SCALES - GENERAL
PAINLEVEL_OUTOF10: 2
PAINLEVEL_OUTOF10: 10

## 2023-05-02 ASSESSMENT — ENCOUNTER SYMPTOMS
DIARRHEA: 0
SORE THROAT: 0
EYE DISCHARGE: 0
RHINORRHEA: 0
COUGH: 0
EYE REDNESS: 0
COLOR CHANGE: 0
SHORTNESS OF BREATH: 1
COUGH: 1
VOMITING: 0
COLOR CHANGE: 1
NAUSEA: 0

## 2023-05-02 ASSESSMENT — PAIN - FUNCTIONAL ASSESSMENT: PAIN_FUNCTIONAL_ASSESSMENT: 0-10

## 2023-05-03 ENCOUNTER — APPOINTMENT (OUTPATIENT)
Dept: CT IMAGING | Age: 53
DRG: 291 | End: 2023-05-03
Payer: COMMERCIAL

## 2023-05-03 ENCOUNTER — APPOINTMENT (OUTPATIENT)
Dept: GENERAL RADIOLOGY | Age: 53
DRG: 291 | End: 2023-05-03
Payer: COMMERCIAL

## 2023-05-03 PROBLEM — I50.813 ACUTE ON CHRONIC RIGHT-SIDED CONGESTIVE HEART FAILURE (HCC): Status: ACTIVE | Noted: 2023-05-02

## 2023-05-03 PROBLEM — J45.901 REACTIVE AIRWAY DISEASE WITH ACUTE EXACERBATION: Status: ACTIVE | Noted: 2023-05-02

## 2023-05-03 PROBLEM — E11.65 TYPE 2 DIABETES MELLITUS WITH HYPERGLYCEMIA (HCC): Status: ACTIVE | Noted: 2023-05-03

## 2023-05-03 PROBLEM — R06.03 RESPIRATORY DISTRESS: Status: ACTIVE | Noted: 2023-05-03

## 2023-05-03 PROBLEM — L03.314 CELLULITIS OF GROIN: Status: RESOLVED | Noted: 2023-05-02 | Resolved: 2023-05-03

## 2023-05-03 PROBLEM — R74.01 TRANSAMINITIS: Status: ACTIVE | Noted: 2023-05-03

## 2023-05-03 PROBLEM — F10.90 ALCOHOL USE DISORDER: Status: ACTIVE | Noted: 2023-05-03

## 2023-05-03 LAB
ABSOLUTE EOS #: 0 K/UL (ref 0–0.4)
ABSOLUTE IMMATURE GRANULOCYTE: 0.07 K/UL (ref 0–0.3)
ABSOLUTE LYMPH #: 0.22 K/UL (ref 1–4.8)
ABSOLUTE MONO #: 0.07 K/UL (ref 0.2–0.8)
ADENOVIRUS PCR: NOT DETECTED
ALBUMIN (CALCULATED): 4.4 G/DL (ref 3.2–5.2)
ALBUMIN PERCENT: 56 % (ref 45–65)
ALBUMIN SERPL-MCNC: 4 G/DL (ref 3.5–5.2)
ALP SERPL-CCNC: 166 U/L (ref 40–129)
ALPHA 1 PERCENT: 4 % (ref 3–6)
ALPHA 2 PERCENT: 9 % (ref 6–13)
ALPHA1 GLOB SERPL ELPH-MCNC: 0.3 G/DL (ref 0.1–0.4)
ALPHA2 GLOB SERPL ELPH-MCNC: 0.7 G/DL (ref 0.5–0.9)
ALT SERPL-CCNC: 14 U/L (ref 5–41)
ANA SER QL IA: NEGATIVE
ANCA MYELOPEROXIDASE: <0.3 AU/ML (ref 0–3.5)
ANCA PROTEINASE 3: <0.7 AU/ML (ref 0–2)
ANION GAP SERPL CALCULATED.3IONS-SCNC: 14 MMOL/L (ref 9–17)
AST SERPL-CCNC: 20 U/L
B PARAP IS1001 DNA NPH QL NAA+NON-PROBE: NOT DETECTED
B PERT DNA SPEC QL NAA+PROBE: NOT DETECTED
B-GLOBULIN SERPL ELPH-MCNC: 0.8 G/DL (ref 0.7–1.4)
BACTERIA: ABNORMAL
BASOPHILS # BLD: 0 %
BASOPHILS ABSOLUTE: 0 K/UL (ref 0–0.2)
BETA PERCENT: 11 % (ref 11–19)
BILIRUB SERPL-MCNC: 1.1 MG/DL (ref 0.3–1.2)
BILIRUBIN URINE: NEGATIVE
BNP SERPL-MCNC: 9563 PG/ML
BUN SERPL-MCNC: 53 MG/DL (ref 6–20)
BUN/CREAT BLD: 28 (ref 9–20)
CALCIUM SERPL-MCNC: 9.3 MG/DL (ref 8.6–10.4)
CHLAMYDIA PNEUMONIAE BY PCR: NOT DETECTED
CHLORIDE SERPL-SCNC: 98 MMOL/L (ref 98–107)
CHLORIDE, UR: <20 MMOL/L
CHOLEST SERPL-MCNC: 97 MG/DL
CHOLESTEROL/HDL RATIO: 2.4
CO2 SERPL-SCNC: 24 MMOL/L (ref 20–31)
COLOR: YELLOW
COMPLEMENT C3: 147 MG/DL (ref 90–180)
COMPLEMENT C4: 20 MG/DL (ref 10–40)
CORONAVIRUS 229E PCR: NOT DETECTED
CORONAVIRUS HKU1 PCR: NOT DETECTED
CORONAVIRUS NL63 PCR: NOT DETECTED
CORONAVIRUS OC43 PCR: NOT DETECTED
CREAT SERPL-MCNC: 1.86 MG/DL (ref 0.7–1.2)
CREATININE URINE: 115.7 MG/DL (ref 39–259)
DSDNA IGG SER QL IA: 2 IU/ML
EOSINOPHIL,URINE: NORMAL
EOSINOPHILS RELATIVE PERCENT: 0 % (ref 1–4)
EPITHELIAL CELLS UA: ABNORMAL /HPF (ref 0–5)
FLUAV RNA NPH QL NAA+NON-PROBE: NOT DETECTED
FLUBV RNA NPH QL NAA+NON-PROBE: NOT DETECTED
FREE KAPPA/LAMBDA RATIO: 1.62 (ref 0.26–1.65)
GAMMA GLOB SERPL ELPH-MCNC: 1.7 G/DL (ref 0.5–1.5)
GAMMA GLOBULIN %: 21 % (ref 9–20)
GFR SERPL CREATININE-BSD FRML MDRD: 43 ML/MIN/1.73M2
GLUCOSE BLD-MCNC: 180 MG/DL (ref 75–110)
GLUCOSE BLD-MCNC: 182 MG/DL (ref 75–110)
GLUCOSE BLD-MCNC: 206 MG/DL (ref 75–110)
GLUCOSE BLD-MCNC: 209 MG/DL (ref 75–110)
GLUCOSE SERPL-MCNC: 178 MG/DL (ref 70–99)
GLUCOSE UR STRIP.AUTO-MCNC: NEGATIVE MG/DL
HAV IGM SER QL: NONREACTIVE
HBV CORE IGM SER QL: NONREACTIVE
HBV SURFACE AG SER QL: NONREACTIVE
HCT VFR BLD AUTO: 24.1 % (ref 40.7–50.3)
HCV AB SER QL: NONREACTIVE
HDLC SERPL-MCNC: 41 MG/DL
HGB BLD-MCNC: 7.3 G/DL (ref 13–17)
HUMAN METAPNEUMOVIRUS PCR: NOT DETECTED
IMMATURE GRANULOCYTES: 1 %
INR PPP: 1.4
KAPPA LC FREE SER-MCNC: 10.95 MG/DL (ref 0.37–1.94)
KETONES UR STRIP.AUTO-MCNC: NEGATIVE MG/DL
L PNEUMO1 AG UR QL IA.RAPID: NEGATIVE
LAMBDA LC FREE SERPL-MCNC: 6.78 MG/DL (ref 0.57–2.63)
LDLC SERPL CALC-MCNC: 40 MG/DL (ref 0–130)
LEUKOCYTE ESTERASE UR QL STRIP.AUTO: ABNORMAL
LYMPHOCYTES # BLD: 3 % (ref 24–44)
MAGNESIUM SERPL-MCNC: 2.4 MG/DL (ref 1.6–2.6)
MAGNESIUM SERPL-MCNC: 2.5 MG/DL (ref 1.6–2.6)
MAGNESIUM SERPL-MCNC: 2.5 MG/DL (ref 1.6–2.6)
MCH RBC QN AUTO: 29.2 PG (ref 25.2–33.5)
MCHC RBC AUTO-ENTMCNC: 30.3 G/DL (ref 28.4–34.8)
MCV RBC AUTO: 96.4 FL (ref 82.6–102.9)
MONOCYTES # BLD: 1 % (ref 1–7)
MYCOPLASMA PNEUMONIAE PCR: NOT DETECTED
NITRITE UR QL STRIP.AUTO: NEGATIVE
NRBC AUTOMATED: 0 PER 100 WBC
NUCLEAR IGG SER IA-RTO: 0.6 U/ML
PARAINFLUENZA 1 PCR: NOT DETECTED
PARAINFLUENZA 2 PCR: NOT DETECTED
PARAINFLUENZA 3 PCR: NOT DETECTED
PARAINFLUENZA 4 PCR: NOT DETECTED
PATHOLOGIST: ABNORMAL
PATHOLOGIST: NORMAL
PDW BLD-RTO: 14.1 % (ref 11.8–14.4)
PLATELET # BLD AUTO: 118 K/UL (ref 138–453)
PMV BLD AUTO: 9.5 FL (ref 8.1–13.5)
POTASSIUM SERPL-SCNC: 4.2 MMOL/L (ref 3.7–5.3)
POTASSIUM SERPL-SCNC: 4.3 MMOL/L (ref 3.7–5.3)
POTASSIUM SERPL-SCNC: 4.3 MMOL/L (ref 3.7–5.3)
PROCALCITONIN SERPL-MCNC: 0.16 NG/ML
PROT SERPL-MCNC: 7.9 G/DL (ref 6.4–8.3)
PROT SERPL-MCNC: 8.2 G/DL (ref 6.4–8.3)
PROT UR STRIP.AUTO-MCNC: 5.5 MG/DL (ref 5–8)
PROT UR STRIP.AUTO-MCNC: ABNORMAL MG/DL
PROTEIN ELECTROPHORESIS, SERUM: ABNORMAL
PROTHROMBIN TIME: 17 SEC (ref 11.5–14.2)
RBC # BLD: 2.5 M/UL (ref 4.21–5.77)
RBC CLUMPS #/AREA URNS AUTO: ABNORMAL /HPF (ref 0–2)
RESP SYNCYTIAL VIRUS PCR: NOT DETECTED
RHINO/ENTEROVIRUS PCR: NOT DETECTED
SARS-COV-2 RNA NPH QL NAA+NON-PROBE: NOT DETECTED
SEG NEUTROPHILS: 95 % (ref 36–66)
SEGMENTED NEUTROPHILS ABSOLUTE COUNT: 7.04 K/UL (ref 1.8–7.7)
SERUM IFX INTERP: NORMAL
SODIUM SERPL-SCNC: 136 MMOL/L (ref 135–144)
SODIUM,UR: 20 MMOL/L
SOURCE: NORMAL
SPECIFIC GRAVITY UA: 1.02 (ref 1–1.03)
SPECIMEN DESCRIPTION: NORMAL
STREP PNEUMONIAE ANTIGEN: NEGATIVE
TOTAL PROT. SUM,%: 101 % (ref 98–102)
TOTAL PROT. SUM: 7.9 G/DL (ref 6.3–8.2)
TOTAL PROTEIN, URINE: 43 MG/DL
TRIGL SERPL-MCNC: 81 MG/DL
TURBIDITY: CLEAR
URINE HGB: ABNORMAL
UROBILINOGEN, URINE: NORMAL
WBC # BLD AUTO: 7.4 K/UL (ref 3.5–11.3)
WBC UA: ABNORMAL /HPF (ref 0–5)

## 2023-05-03 PROCEDURE — 99232 SBSQ HOSP IP/OBS MODERATE 35: CPT | Performed by: INTERNAL MEDICINE

## 2023-05-03 PROCEDURE — 6360000002 HC RX W HCPCS: Performed by: NURSE PRACTITIONER

## 2023-05-03 PROCEDURE — 2580000003 HC RX 258: Performed by: NURSE PRACTITIONER

## 2023-05-03 PROCEDURE — 94761 N-INVAS EAR/PLS OXIMETRY MLT: CPT

## 2023-05-03 PROCEDURE — 84145 PROCALCITONIN (PCT): CPT

## 2023-05-03 PROCEDURE — 6370000000 HC RX 637 (ALT 250 FOR IP): Performed by: NURSE PRACTITIONER

## 2023-05-03 PROCEDURE — 94640 AIRWAY INHALATION TREATMENT: CPT

## 2023-05-03 PROCEDURE — 84166 PROTEIN E-PHORESIS/URINE/CSF: CPT

## 2023-05-03 PROCEDURE — 81001 URINALYSIS AUTO W/SCOPE: CPT

## 2023-05-03 PROCEDURE — 87205 SMEAR GRAM STAIN: CPT

## 2023-05-03 PROCEDURE — 80061 LIPID PANEL: CPT

## 2023-05-03 PROCEDURE — 2500000003 HC RX 250 WO HCPCS: Performed by: INTERNAL MEDICINE

## 2023-05-03 PROCEDURE — 84300 ASSAY OF URINE SODIUM: CPT

## 2023-05-03 PROCEDURE — 84132 ASSAY OF SERUM POTASSIUM: CPT

## 2023-05-03 PROCEDURE — 71045 X-RAY EXAM CHEST 1 VIEW: CPT

## 2023-05-03 PROCEDURE — 82947 ASSAY GLUCOSE BLOOD QUANT: CPT

## 2023-05-03 PROCEDURE — 82570 ASSAY OF URINE CREATININE: CPT

## 2023-05-03 PROCEDURE — 2700000000 HC OXYGEN THERAPY PER DAY

## 2023-05-03 PROCEDURE — 6360000002 HC RX W HCPCS: Performed by: INTERNAL MEDICINE

## 2023-05-03 PROCEDURE — 83735 ASSAY OF MAGNESIUM: CPT

## 2023-05-03 PROCEDURE — 87449 NOS EACH ORGANISM AG IA: CPT

## 2023-05-03 PROCEDURE — 70490 CT SOFT TISSUE NECK W/O DYE: CPT

## 2023-05-03 PROCEDURE — 2580000003 HC RX 258: Performed by: INTERNAL MEDICINE

## 2023-05-03 PROCEDURE — 80053 COMPREHEN METABOLIC PANEL: CPT

## 2023-05-03 PROCEDURE — 84156 ASSAY OF PROTEIN URINE: CPT

## 2023-05-03 PROCEDURE — 0202U NFCT DS 22 TRGT SARS-COV-2: CPT

## 2023-05-03 PROCEDURE — 85025 COMPLETE CBC W/AUTO DIFF WBC: CPT

## 2023-05-03 PROCEDURE — 87154 CUL TYP ID BLD PTHGN 6+ TRGT: CPT

## 2023-05-03 PROCEDURE — 6370000000 HC RX 637 (ALT 250 FOR IP): Performed by: INTERNAL MEDICINE

## 2023-05-03 PROCEDURE — 82436 ASSAY OF URINE CHLORIDE: CPT

## 2023-05-03 PROCEDURE — 36415 COLL VENOUS BLD VENIPUNCTURE: CPT

## 2023-05-03 PROCEDURE — 2000000000 HC ICU R&B

## 2023-05-03 PROCEDURE — 94660 CPAP INITIATION&MGMT: CPT

## 2023-05-03 PROCEDURE — 87040 BLOOD CULTURE FOR BACTERIA: CPT

## 2023-05-03 PROCEDURE — 2500000003 HC RX 250 WO HCPCS: Performed by: NURSE PRACTITIONER

## 2023-05-03 PROCEDURE — 83880 ASSAY OF NATRIURETIC PEPTIDE: CPT

## 2023-05-03 PROCEDURE — 85610 PROTHROMBIN TIME: CPT

## 2023-05-03 PROCEDURE — 87899 AGENT NOS ASSAY W/OPTIC: CPT

## 2023-05-03 RX ORDER — ROPINIROLE 2 MG/1
4 TABLET, FILM COATED ORAL DAILY
Status: DISCONTINUED | OUTPATIENT
Start: 2023-05-04 | End: 2023-05-03

## 2023-05-03 RX ORDER — INSULIN LISPRO 100 [IU]/ML
0-4 INJECTION, SOLUTION INTRAVENOUS; SUBCUTANEOUS NIGHTLY
Status: DISCONTINUED | OUTPATIENT
Start: 2023-05-03 | End: 2023-05-08 | Stop reason: HOSPADM

## 2023-05-03 RX ORDER — INSULIN LISPRO 100 [IU]/ML
0-8 INJECTION, SOLUTION INTRAVENOUS; SUBCUTANEOUS
Status: DISCONTINUED | OUTPATIENT
Start: 2023-05-03 | End: 2023-05-08 | Stop reason: HOSPADM

## 2023-05-03 RX ORDER — ROPINIROLE 2 MG/1
4 TABLET, FILM COATED ORAL NIGHTLY
Status: DISCONTINUED | OUTPATIENT
Start: 2023-05-03 | End: 2023-05-08 | Stop reason: HOSPADM

## 2023-05-03 RX ORDER — DEXAMETHASONE 2 MG/1
4 TABLET ORAL EVERY 12 HOURS SCHEDULED
Status: COMPLETED | OUTPATIENT
Start: 2023-05-03 | End: 2023-05-04

## 2023-05-03 RX ORDER — ACETAMINOPHEN 325 MG/1
650 TABLET ORAL EVERY 4 HOURS PRN
Status: DISCONTINUED | OUTPATIENT
Start: 2023-05-03 | End: 2023-05-08 | Stop reason: HOSPADM

## 2023-05-03 RX ORDER — SODIUM CHLORIDE 9 MG/ML
INJECTION, SOLUTION INTRAVENOUS CONTINUOUS
Status: DISCONTINUED | OUTPATIENT
Start: 2023-05-03 | End: 2023-05-08 | Stop reason: HOSPADM

## 2023-05-03 RX ADMIN — HEPARIN SODIUM 5000 UNITS: 5000 INJECTION INTRAVENOUS; SUBCUTANEOUS at 06:22

## 2023-05-03 RX ADMIN — CEFTRIAXONE SODIUM 1000 MG: 1 INJECTION, POWDER, FOR SOLUTION INTRAMUSCULAR; INTRAVENOUS at 15:54

## 2023-05-03 RX ADMIN — LORAZEPAM 0.5 MG: 0.5 TABLET ORAL at 10:00

## 2023-05-03 RX ADMIN — ASPIRIN 81 MG: 81 TABLET, COATED ORAL at 10:14

## 2023-05-03 RX ADMIN — DEXMEDETOMIDINE 0.2 MCG/KG/HR: 100 INJECTION, SOLUTION INTRAVENOUS at 16:53

## 2023-05-03 RX ADMIN — FUROSEMIDE 40 MG/HR: 10 INJECTION, SOLUTION INTRAMUSCULAR; INTRAVENOUS at 14:21

## 2023-05-03 RX ADMIN — BUDESONIDE AND FORMOTEROL FUMARATE DIHYDRATE 2 PUFF: 160; 4.5 AEROSOL RESPIRATORY (INHALATION) at 08:12

## 2023-05-03 RX ADMIN — HEPARIN SODIUM 5000 UNITS: 5000 INJECTION INTRAVENOUS; SUBCUTANEOUS at 21:36

## 2023-05-03 RX ADMIN — FUROSEMIDE 20 MG/HR: 10 INJECTION, SOLUTION INTRAMUSCULAR; INTRAVENOUS at 00:28

## 2023-05-03 RX ADMIN — FUROSEMIDE 40 MG/HR: 10 INJECTION, SOLUTION INTRAMUSCULAR; INTRAVENOUS at 11:12

## 2023-05-03 RX ADMIN — FERROUS SULFATE TAB EC 325 MG (65 MG FE EQUIVALENT) 325 MG: 325 (65 FE) TABLET DELAYED RESPONSE at 08:08

## 2023-05-03 RX ADMIN — HEPARIN SODIUM 5000 UNITS: 5000 INJECTION INTRAVENOUS; SUBCUTANEOUS at 14:41

## 2023-05-03 RX ADMIN — FOLIC ACID: 5 INJECTION, SOLUTION INTRAMUSCULAR; INTRAVENOUS; SUBCUTANEOUS at 10:38

## 2023-05-03 RX ADMIN — FLUTICASONE PROPIONATE 1 SPRAY: 50 SPRAY, METERED NASAL at 10:26

## 2023-05-03 RX ADMIN — FUROSEMIDE 40 MG/HR: 10 INJECTION, SOLUTION INTRAMUSCULAR; INTRAVENOUS at 06:34

## 2023-05-03 RX ADMIN — ROPINIROLE HYDROCHLORIDE 4 MG: 2 TABLET, FILM COATED ORAL at 21:35

## 2023-05-03 RX ADMIN — DEXAMETHASONE 4 MG: 2 TABLET ORAL at 21:35

## 2023-05-03 RX ADMIN — IPRATROPIUM BROMIDE AND ALBUTEROL SULFATE 1 AMPULE: 2.5; .5 SOLUTION RESPIRATORY (INHALATION) at 14:58

## 2023-05-03 RX ADMIN — FUROSEMIDE 40 MG/HR: 10 INJECTION, SOLUTION INTRAMUSCULAR; INTRAVENOUS at 22:32

## 2023-05-03 RX ADMIN — ACETAMINOPHEN 650 MG: 325 TABLET ORAL at 00:51

## 2023-05-03 RX ADMIN — IPRATROPIUM BROMIDE AND ALBUTEROL SULFATE 1 AMPULE: 2.5; .5 SOLUTION RESPIRATORY (INHALATION) at 10:53

## 2023-05-03 RX ADMIN — FUROSEMIDE 40 MG/HR: 10 INJECTION, SOLUTION INTRAMUSCULAR; INTRAVENOUS at 03:59

## 2023-05-03 RX ADMIN — WATER 60 MG: 1 INJECTION INTRAMUSCULAR; INTRAVENOUS; SUBCUTANEOUS at 03:26

## 2023-05-03 RX ADMIN — SODIUM CHLORIDE: 9 INJECTION, SOLUTION INTRAVENOUS at 14:50

## 2023-05-03 RX ADMIN — MONTELUKAST 10 MG: 10 TABLET, FILM COATED ORAL at 21:35

## 2023-05-03 RX ADMIN — ANTI-FUNGAL POWDER MICONAZOLE NITRATE TALC FREE: 1.42 POWDER TOPICAL at 10:15

## 2023-05-03 RX ADMIN — FUROSEMIDE 40 MG/HR: 10 INJECTION, SOLUTION INTRAMUSCULAR; INTRAVENOUS at 17:24

## 2023-05-03 RX ADMIN — DEXMEDETOMIDINE 0.5 MCG/KG/HR: 100 INJECTION, SOLUTION INTRAVENOUS at 22:03

## 2023-05-03 RX ADMIN — DEXAMETHASONE 4 MG: 2 TABLET ORAL at 10:14

## 2023-05-03 RX ADMIN — GLIPIZIDE 10 MG: 10 TABLET ORAL at 06:23

## 2023-05-03 RX ADMIN — ANTI-FUNGAL POWDER MICONAZOLE NITRATE TALC FREE: 1.42 POWDER TOPICAL at 21:36

## 2023-05-03 RX ADMIN — INSULIN LISPRO 2 UNITS: 100 INJECTION, SOLUTION INTRAVENOUS; SUBCUTANEOUS at 17:39

## 2023-05-03 RX ADMIN — BUDESONIDE AND FORMOTEROL FUMARATE DIHYDRATE 2 PUFF: 160; 4.5 AEROSOL RESPIRATORY (INHALATION) at 18:29

## 2023-05-03 RX ADMIN — IPRATROPIUM BROMIDE AND ALBUTEROL SULFATE 1 AMPULE: 2.5; .5 SOLUTION RESPIRATORY (INHALATION) at 18:28

## 2023-05-03 RX ADMIN — DEXMEDETOMIDINE 0.5 MCG/KG/HR: 100 INJECTION, SOLUTION INTRAVENOUS at 20:32

## 2023-05-03 RX ADMIN — AZITHROMYCIN MONOHYDRATE 500 MG: 500 INJECTION, POWDER, LYOPHILIZED, FOR SOLUTION INTRAVENOUS at 14:51

## 2023-05-03 RX ADMIN — IPRATROPIUM BROMIDE AND ALBUTEROL SULFATE 1 AMPULE: 2.5; .5 SOLUTION RESPIRATORY (INHALATION) at 08:11

## 2023-05-03 RX ADMIN — FUROSEMIDE 40 MG/HR: 10 INJECTION, SOLUTION INTRAMUSCULAR; INTRAVENOUS at 19:30

## 2023-05-03 ASSESSMENT — PAIN SCALES - GENERAL: PAINLEVEL_OUTOF10: 5

## 2023-05-03 ASSESSMENT — PAIN DESCRIPTION - LOCATION: LOCATION: BACK;LEG

## 2023-05-04 ENCOUNTER — APPOINTMENT (OUTPATIENT)
Dept: CT IMAGING | Age: 53
DRG: 291 | End: 2023-05-04
Payer: COMMERCIAL

## 2023-05-04 LAB
ALBUMIN SERPL-MCNC: 4.2 G/DL (ref 3.5–5.2)
ALP SERPL-CCNC: 154 U/L (ref 40–129)
ALT SERPL-CCNC: 15 U/L (ref 5–41)
AMMONIA PLAS-SCNC: 41 UMOL/L (ref 16–60)
ANION GAP SERPL CALCULATED.3IONS-SCNC: 15 MMOL/L (ref 9–17)
AST SERPL-CCNC: 24 U/L
BILIRUB SERPL-MCNC: 0.7 MG/DL (ref 0.3–1.2)
BUN SERPL-MCNC: 66 MG/DL (ref 6–20)
BUN/CREAT BLD: 35 (ref 9–20)
CALCIUM SERPL-MCNC: 9.2 MG/DL (ref 8.6–10.4)
CHLORIDE SERPL-SCNC: 99 MMOL/L (ref 98–107)
CO2 SERPL-SCNC: 23 MMOL/L (ref 20–31)
CREAT SERPL-MCNC: 1.86 MG/DL (ref 0.7–1.2)
GFR SERPL CREATININE-BSD FRML MDRD: 43 ML/MIN/1.73M2
GLUCOSE BLD-MCNC: 143 MG/DL (ref 75–110)
GLUCOSE BLD-MCNC: 176 MG/DL (ref 75–110)
GLUCOSE BLD-MCNC: 196 MG/DL (ref 75–110)
GLUCOSE BLD-MCNC: 301 MG/DL (ref 75–110)
GLUCOSE SERPL-MCNC: 184 MG/DL (ref 70–99)
MAGNESIUM SERPL-MCNC: 2.6 MG/DL (ref 1.6–2.6)
MAGNESIUM SERPL-MCNC: 2.6 MG/DL (ref 1.6–2.6)
MICROORGANISM SPEC CULT: ABNORMAL
PHOSPHATE SERPL-MCNC: 5.9 MG/DL (ref 2.5–4.5)
POTASSIUM SERPL-SCNC: 4.2 MMOL/L (ref 3.7–5.3)
POTASSIUM SERPL-SCNC: 4.3 MMOL/L (ref 3.7–5.3)
POTASSIUM SERPL-SCNC: 4.3 MMOL/L (ref 3.7–5.3)
PROT SERPL-MCNC: 8.4 G/DL (ref 6.4–8.3)
SERVICE CMNT-IMP: ABNORMAL
SODIUM SERPL-SCNC: 137 MMOL/L (ref 135–144)
SPECIMEN DESCRIPTION: ABNORMAL

## 2023-05-04 PROCEDURE — 2580000003 HC RX 258: Performed by: INTERNAL MEDICINE

## 2023-05-04 PROCEDURE — 6370000000 HC RX 637 (ALT 250 FOR IP): Performed by: INTERNAL MEDICINE

## 2023-05-04 PROCEDURE — 6370000000 HC RX 637 (ALT 250 FOR IP): Performed by: NURSE PRACTITIONER

## 2023-05-04 PROCEDURE — 83735 ASSAY OF MAGNESIUM: CPT

## 2023-05-04 PROCEDURE — 87040 BLOOD CULTURE FOR BACTERIA: CPT

## 2023-05-04 PROCEDURE — 82140 ASSAY OF AMMONIA: CPT

## 2023-05-04 PROCEDURE — 99254 IP/OBS CNSLTJ NEW/EST MOD 60: CPT | Performed by: NURSE PRACTITIONER

## 2023-05-04 PROCEDURE — 80053 COMPREHEN METABOLIC PANEL: CPT

## 2023-05-04 PROCEDURE — 84100 ASSAY OF PHOSPHORUS: CPT

## 2023-05-04 PROCEDURE — 94640 AIRWAY INHALATION TREATMENT: CPT

## 2023-05-04 PROCEDURE — 2000000000 HC ICU R&B

## 2023-05-04 PROCEDURE — 99232 SBSQ HOSP IP/OBS MODERATE 35: CPT | Performed by: STUDENT IN AN ORGANIZED HEALTH CARE EDUCATION/TRAINING PROGRAM

## 2023-05-04 PROCEDURE — 94660 CPAP INITIATION&MGMT: CPT

## 2023-05-04 PROCEDURE — 36415 COLL VENOUS BLD VENIPUNCTURE: CPT

## 2023-05-04 PROCEDURE — 6360000002 HC RX W HCPCS: Performed by: INTERNAL MEDICINE

## 2023-05-04 PROCEDURE — 2580000003 HC RX 258: Performed by: STUDENT IN AN ORGANIZED HEALTH CARE EDUCATION/TRAINING PROGRAM

## 2023-05-04 PROCEDURE — 6360000002 HC RX W HCPCS: Performed by: NURSE PRACTITIONER

## 2023-05-04 PROCEDURE — 2700000000 HC OXYGEN THERAPY PER DAY

## 2023-05-04 PROCEDURE — 2500000003 HC RX 250 WO HCPCS: Performed by: INTERNAL MEDICINE

## 2023-05-04 PROCEDURE — 2500000003 HC RX 250 WO HCPCS: Performed by: STUDENT IN AN ORGANIZED HEALTH CARE EDUCATION/TRAINING PROGRAM

## 2023-05-04 PROCEDURE — 82947 ASSAY GLUCOSE BLOOD QUANT: CPT

## 2023-05-04 PROCEDURE — 94761 N-INVAS EAR/PLS OXIMETRY MLT: CPT

## 2023-05-04 PROCEDURE — 6370000000 HC RX 637 (ALT 250 FOR IP): Performed by: STUDENT IN AN ORGANIZED HEALTH CARE EDUCATION/TRAINING PROGRAM

## 2023-05-04 PROCEDURE — 84132 ASSAY OF SERUM POTASSIUM: CPT

## 2023-05-04 RX ORDER — GAUZE BANDAGE 2" X 2"
100 BANDAGE TOPICAL DAILY
Status: DISCONTINUED | OUTPATIENT
Start: 2023-05-04 | End: 2023-05-08 | Stop reason: HOSPADM

## 2023-05-04 RX ORDER — IPRATROPIUM BROMIDE AND ALBUTEROL SULFATE 2.5; .5 MG/3ML; MG/3ML
1 SOLUTION RESPIRATORY (INHALATION) EVERY 4 HOURS PRN
Status: DISCONTINUED | OUTPATIENT
Start: 2023-05-04 | End: 2023-05-05

## 2023-05-04 RX ORDER — FOLIC ACID 1 MG/1
1 TABLET ORAL DAILY
Status: DISCONTINUED | OUTPATIENT
Start: 2023-05-04 | End: 2023-05-08 | Stop reason: HOSPADM

## 2023-05-04 RX ORDER — LANOLIN ALCOHOL/MO/W.PET/CERES
6 CREAM (GRAM) TOPICAL NIGHTLY
Status: DISCONTINUED | OUTPATIENT
Start: 2023-05-04 | End: 2023-05-08 | Stop reason: HOSPADM

## 2023-05-04 RX ORDER — AZITHROMYCIN 250 MG/1
500 TABLET, FILM COATED ORAL EVERY 24 HOURS
Status: DISCONTINUED | OUTPATIENT
Start: 2023-05-04 | End: 2023-05-04

## 2023-05-04 RX ORDER — METOLAZONE 2.5 MG/1
2.5 TABLET ORAL DAILY
Status: DISCONTINUED | OUTPATIENT
Start: 2023-05-04 | End: 2023-05-06

## 2023-05-04 RX ORDER — FUROSEMIDE 10 MG/ML
80 INJECTION INTRAMUSCULAR; INTRAVENOUS 4 TIMES DAILY
Status: DISCONTINUED | OUTPATIENT
Start: 2023-05-04 | End: 2023-05-05

## 2023-05-04 RX ADMIN — Medication 100 MG: at 09:04

## 2023-05-04 RX ADMIN — IPRATROPIUM BROMIDE AND ALBUTEROL SULFATE 1 AMPULE: 2.5; .5 SOLUTION RESPIRATORY (INHALATION) at 15:16

## 2023-05-04 RX ADMIN — FUROSEMIDE 80 MG: 10 INJECTION, SOLUTION INTRAMUSCULAR; INTRAVENOUS at 20:53

## 2023-05-04 RX ADMIN — Medication 12.5 MG: at 13:38

## 2023-05-04 RX ADMIN — ROPINIROLE HYDROCHLORIDE 4 MG: 2 TABLET, FILM COATED ORAL at 20:53

## 2023-05-04 RX ADMIN — DEXAMETHASONE 4 MG: 2 TABLET ORAL at 20:53

## 2023-05-04 RX ADMIN — IPRATROPIUM BROMIDE AND ALBUTEROL SULFATE 1 AMPULE: 2.5; .5 SOLUTION RESPIRATORY (INHALATION) at 06:02

## 2023-05-04 RX ADMIN — ASPIRIN 81 MG: 81 TABLET, COATED ORAL at 09:04

## 2023-05-04 RX ADMIN — BUDESONIDE AND FORMOTEROL FUMARATE DIHYDRATE 2 PUFF: 160; 4.5 AEROSOL RESPIRATORY (INHALATION) at 19:43

## 2023-05-04 RX ADMIN — LORAZEPAM 0.5 MG: 0.5 TABLET ORAL at 23:15

## 2023-05-04 RX ADMIN — BUDESONIDE AND FORMOTEROL FUMARATE DIHYDRATE 2 PUFF: 160; 4.5 AEROSOL RESPIRATORY (INHALATION) at 06:01

## 2023-05-04 RX ADMIN — DEXMEDETOMIDINE 0.7 MCG/KG/HR: 100 INJECTION, SOLUTION INTRAVENOUS at 06:03

## 2023-05-04 RX ADMIN — ANTI-FUNGAL POWDER MICONAZOLE NITRATE TALC FREE: 1.42 POWDER TOPICAL at 09:06

## 2023-05-04 RX ADMIN — FOLIC ACID 1 MG: 1 TABLET ORAL at 09:04

## 2023-05-04 RX ADMIN — DEXMEDETOMIDINE 0.3 MCG/KG/HR: 100 INJECTION, SOLUTION INTRAVENOUS at 09:09

## 2023-05-04 RX ADMIN — DEXMEDETOMIDINE 0.5 MCG/KG/HR: 100 INJECTION, SOLUTION INTRAVENOUS at 02:15

## 2023-05-04 RX ADMIN — IPRATROPIUM BROMIDE AND ALBUTEROL SULFATE 1 AMPULE: 2.5; .5 SOLUTION RESPIRATORY (INHALATION) at 10:49

## 2023-05-04 RX ADMIN — AZITHROMYCIN MONOHYDRATE 500 MG: 250 TABLET ORAL at 12:56

## 2023-05-04 RX ADMIN — FLUTICASONE PROPIONATE 1 SPRAY: 50 SPRAY, METERED NASAL at 09:07

## 2023-05-04 RX ADMIN — FUROSEMIDE 80 MG: 10 INJECTION, SOLUTION INTRAMUSCULAR; INTRAVENOUS at 12:58

## 2023-05-04 RX ADMIN — FUROSEMIDE 40 MG/HR: 10 INJECTION, SOLUTION INTRAMUSCULAR; INTRAVENOUS at 03:57

## 2023-05-04 RX ADMIN — HEPARIN SODIUM 5000 UNITS: 5000 INJECTION INTRAVENOUS; SUBCUTANEOUS at 05:36

## 2023-05-04 RX ADMIN — DEXMEDETOMIDINE 0.5 MCG/KG/HR: 100 INJECTION, SOLUTION INTRAVENOUS at 16:13

## 2023-05-04 RX ADMIN — FERROUS SULFATE TAB EC 325 MG (65 MG FE EQUIVALENT) 325 MG: 325 (65 FE) TABLET DELAYED RESPONSE at 09:04

## 2023-05-04 RX ADMIN — MONTELUKAST 10 MG: 10 TABLET, FILM COATED ORAL at 20:53

## 2023-05-04 RX ADMIN — HEPARIN SODIUM 5000 UNITS: 5000 INJECTION INTRAVENOUS; SUBCUTANEOUS at 14:09

## 2023-05-04 RX ADMIN — FUROSEMIDE 40 MG/HR: 10 INJECTION, SOLUTION INTRAMUSCULAR; INTRAVENOUS at 01:31

## 2023-05-04 RX ADMIN — FUROSEMIDE 40 MG/HR: 10 INJECTION, SOLUTION INTRAMUSCULAR; INTRAVENOUS at 06:27

## 2023-05-04 RX ADMIN — INSULIN LISPRO 6 UNITS: 100 INJECTION, SOLUTION INTRAVENOUS; SUBCUTANEOUS at 12:56

## 2023-05-04 RX ADMIN — FUROSEMIDE 40 MG/HR: 10 INJECTION, SOLUTION INTRAMUSCULAR; INTRAVENOUS at 09:11

## 2023-05-04 RX ADMIN — METOLAZONE 2.5 MG: 2.5 TABLET ORAL at 12:56

## 2023-05-04 RX ADMIN — ANTI-FUNGAL POWDER MICONAZOLE NITRATE TALC FREE: 1.42 POWDER TOPICAL at 20:53

## 2023-05-04 RX ADMIN — DEXMEDETOMIDINE 0.7 MCG/KG/HR: 100 INJECTION, SOLUTION INTRAVENOUS at 21:06

## 2023-05-04 RX ADMIN — DEXAMETHASONE 4 MG: 2 TABLET ORAL at 09:28

## 2023-05-04 RX ADMIN — FUROSEMIDE 80 MG: 10 INJECTION, SOLUTION INTRAMUSCULAR; INTRAVENOUS at 17:52

## 2023-05-05 ENCOUNTER — APPOINTMENT (OUTPATIENT)
Dept: GENERAL RADIOLOGY | Age: 53
DRG: 291 | End: 2023-05-05
Payer: COMMERCIAL

## 2023-05-05 PROBLEM — H57.02 EPISODIC ANISOCORIA: Status: ACTIVE | Noted: 2023-05-05

## 2023-05-05 LAB
ABSOLUTE EOS #: 0 K/UL (ref 0–0.4)
ABSOLUTE IMMATURE GRANULOCYTE: 0 K/UL (ref 0–0.3)
ABSOLUTE LYMPH #: 0.29 K/UL (ref 1–4.8)
ABSOLUTE MONO #: 0.48 K/UL (ref 0.2–0.8)
ANION GAP SERPL CALCULATED.3IONS-SCNC: 13 MMOL/L (ref 9–17)
BASOPHILS # BLD: 0 %
BASOPHILS ABSOLUTE: 0 K/UL (ref 0–0.2)
BUN SERPL-MCNC: 72 MG/DL (ref 6–20)
BUN/CREAT BLD: 45 (ref 9–20)
CALCIUM SERPL-MCNC: 9.3 MG/DL (ref 8.6–10.4)
CHLORIDE SERPL-SCNC: 99 MMOL/L (ref 98–107)
CO2 SERPL-SCNC: 26 MMOL/L (ref 20–31)
CREAT SERPL-MCNC: 1.6 MG/DL (ref 0.7–1.2)
EOSINOPHILS RELATIVE PERCENT: 0 % (ref 1–4)
GFR SERPL CREATININE-BSD FRML MDRD: 52 ML/MIN/1.73M2
GLUCOSE BLD-MCNC: 129 MG/DL (ref 75–110)
GLUCOSE BLD-MCNC: 140 MG/DL (ref 75–110)
GLUCOSE BLD-MCNC: 140 MG/DL (ref 75–110)
GLUCOSE BLD-MCNC: 150 MG/DL (ref 75–110)
GLUCOSE BLD-MCNC: 187 MG/DL (ref 75–110)
GLUCOSE BLD-MCNC: 84 MG/DL (ref 75–110)
GLUCOSE SERPL-MCNC: 153 MG/DL (ref 70–99)
HCT VFR BLD AUTO: 25.8 % (ref 40.7–50.3)
HGB BLD-MCNC: 7.8 G/DL (ref 13–17)
IMMATURE GRANULOCYTES: 0 %
LYMPHOCYTES # BLD: 3 % (ref 24–44)
MAGNESIUM SERPL-MCNC: 2.5 MG/DL (ref 1.6–2.6)
MCH RBC QN AUTO: 28.9 PG (ref 25.2–33.5)
MCHC RBC AUTO-ENTMCNC: 30.2 G/DL (ref 28.4–34.8)
MCV RBC AUTO: 95.6 FL (ref 82.6–102.9)
MONOCYTES # BLD: 5 % (ref 1–7)
NRBC AUTOMATED: 0 PER 100 WBC
P E INTERPRETATION, U: NORMAL
PATHOLOGIST: NORMAL
PDW BLD-RTO: 14.1 % (ref 11.8–14.4)
PLATELET # BLD AUTO: 112 K/UL (ref 138–453)
PMV BLD AUTO: 9.9 FL (ref 8.1–13.5)
POTASSIUM SERPL-SCNC: 3.9 MMOL/L (ref 3.7–5.3)
RBC # BLD: 2.7 M/UL (ref 4.21–5.77)
SEG NEUTROPHILS: 92 % (ref 36–66)
SEGMENTED NEUTROPHILS ABSOLUTE COUNT: 8.73 K/UL (ref 1.8–7.7)
SODIUM SERPL-SCNC: 138 MMOL/L (ref 135–144)
SPECIMEN TYPE: NORMAL
URINE TOTAL PROTEIN: 43 MG/DL
WBC # BLD AUTO: 9.5 K/UL (ref 3.5–11.3)

## 2023-05-05 PROCEDURE — 97530 THERAPEUTIC ACTIVITIES: CPT

## 2023-05-05 PROCEDURE — 6360000002 HC RX W HCPCS

## 2023-05-05 PROCEDURE — 36415 COLL VENOUS BLD VENIPUNCTURE: CPT

## 2023-05-05 PROCEDURE — 97112 NEUROMUSCULAR REEDUCATION: CPT

## 2023-05-05 PROCEDURE — 2000000000 HC ICU R&B

## 2023-05-05 PROCEDURE — 6370000000 HC RX 637 (ALT 250 FOR IP): Performed by: STUDENT IN AN ORGANIZED HEALTH CARE EDUCATION/TRAINING PROGRAM

## 2023-05-05 PROCEDURE — 6370000000 HC RX 637 (ALT 250 FOR IP): Performed by: INTERNAL MEDICINE

## 2023-05-05 PROCEDURE — 6360000002 HC RX W HCPCS: Performed by: NURSE PRACTITIONER

## 2023-05-05 PROCEDURE — 71045 X-RAY EXAM CHEST 1 VIEW: CPT

## 2023-05-05 PROCEDURE — 6360000002 HC RX W HCPCS: Performed by: INTERNAL MEDICINE

## 2023-05-05 PROCEDURE — 83735 ASSAY OF MAGNESIUM: CPT

## 2023-05-05 PROCEDURE — 97162 PT EVAL MOD COMPLEX 30 MIN: CPT

## 2023-05-05 PROCEDURE — 6370000000 HC RX 637 (ALT 250 FOR IP): Performed by: NURSE PRACTITIONER

## 2023-05-05 PROCEDURE — 82947 ASSAY GLUCOSE BLOOD QUANT: CPT

## 2023-05-05 PROCEDURE — 99232 SBSQ HOSP IP/OBS MODERATE 35: CPT | Performed by: NURSE PRACTITIONER

## 2023-05-05 PROCEDURE — 2500000003 HC RX 250 WO HCPCS: Performed by: STUDENT IN AN ORGANIZED HEALTH CARE EDUCATION/TRAINING PROGRAM

## 2023-05-05 PROCEDURE — 97167 OT EVAL HIGH COMPLEX 60 MIN: CPT

## 2023-05-05 PROCEDURE — 2580000003 HC RX 258: Performed by: INTERNAL MEDICINE

## 2023-05-05 PROCEDURE — 94640 AIRWAY INHALATION TREATMENT: CPT

## 2023-05-05 PROCEDURE — 80048 BASIC METABOLIC PNL TOTAL CA: CPT

## 2023-05-05 PROCEDURE — 94761 N-INVAS EAR/PLS OXIMETRY MLT: CPT

## 2023-05-05 PROCEDURE — 97535 SELF CARE MNGMENT TRAINING: CPT

## 2023-05-05 PROCEDURE — 85025 COMPLETE CBC W/AUTO DIFF WBC: CPT

## 2023-05-05 PROCEDURE — 99232 SBSQ HOSP IP/OBS MODERATE 35: CPT | Performed by: STUDENT IN AN ORGANIZED HEALTH CARE EDUCATION/TRAINING PROGRAM

## 2023-05-05 PROCEDURE — 2700000000 HC OXYGEN THERAPY PER DAY

## 2023-05-05 PROCEDURE — 2500000003 HC RX 250 WO HCPCS: Performed by: INTERNAL MEDICINE

## 2023-05-05 PROCEDURE — 94660 CPAP INITIATION&MGMT: CPT

## 2023-05-05 PROCEDURE — 2580000003 HC RX 258: Performed by: STUDENT IN AN ORGANIZED HEALTH CARE EDUCATION/TRAINING PROGRAM

## 2023-05-05 PROCEDURE — 99222 1ST HOSP IP/OBS MODERATE 55: CPT | Performed by: PSYCHIATRY & NEUROLOGY

## 2023-05-05 RX ORDER — FUROSEMIDE 10 MG/ML
80 INJECTION INTRAMUSCULAR; INTRAVENOUS 3 TIMES DAILY
Status: DISCONTINUED | OUTPATIENT
Start: 2023-05-05 | End: 2023-05-06

## 2023-05-05 RX ORDER — ALBUTEROL SULFATE 2.5 MG/3ML
2.5 SOLUTION RESPIRATORY (INHALATION) EVERY 6 HOURS PRN
Status: DISCONTINUED | OUTPATIENT
Start: 2023-05-05 | End: 2023-05-08 | Stop reason: HOSPADM

## 2023-05-05 RX ORDER — ALBUTEROL SULFATE 2.5 MG/3ML
SOLUTION RESPIRATORY (INHALATION)
Status: COMPLETED
Start: 2023-05-05 | End: 2023-05-05

## 2023-05-05 RX ORDER — DOCUSATE SODIUM 100 MG/1
100 CAPSULE, LIQUID FILLED ORAL DAILY
Status: DISCONTINUED | OUTPATIENT
Start: 2023-05-05 | End: 2023-05-08 | Stop reason: HOSPADM

## 2023-05-05 RX ORDER — POLYETHYLENE GLYCOL 3350 17 G/17G
17 POWDER, FOR SOLUTION ORAL DAILY
Status: DISCONTINUED | OUTPATIENT
Start: 2023-05-05 | End: 2023-05-08 | Stop reason: HOSPADM

## 2023-05-05 RX ORDER — SPIRONOLACTONE 25 MG/1
25 TABLET ORAL DAILY
Status: DISCONTINUED | OUTPATIENT
Start: 2023-05-06 | End: 2023-05-08 | Stop reason: HOSPADM

## 2023-05-05 RX ORDER — SODIUM CHLORIDE FOR INHALATION 0.9 %
3 VIAL, NEBULIZER (ML) INHALATION PRN
Status: DISCONTINUED | OUTPATIENT
Start: 2023-05-05 | End: 2023-05-08 | Stop reason: HOSPADM

## 2023-05-05 RX ADMIN — BUDESONIDE AND FORMOTEROL FUMARATE DIHYDRATE 2 PUFF: 160; 4.5 AEROSOL RESPIRATORY (INHALATION) at 19:45

## 2023-05-05 RX ADMIN — FLUTICASONE PROPIONATE 1 SPRAY: 50 SPRAY, METERED NASAL at 08:56

## 2023-05-05 RX ADMIN — Medication 3 ML: at 14:38

## 2023-05-05 RX ADMIN — ALBUTEROL SULFATE 2.5 MG: 2.5 SOLUTION RESPIRATORY (INHALATION) at 19:45

## 2023-05-05 RX ADMIN — LORAZEPAM 0.5 MG: 0.5 TABLET ORAL at 22:09

## 2023-05-05 RX ADMIN — ANTI-FUNGAL POWDER MICONAZOLE NITRATE TALC FREE: 1.42 POWDER TOPICAL at 08:56

## 2023-05-05 RX ADMIN — MONTELUKAST 10 MG: 10 TABLET, FILM COATED ORAL at 21:00

## 2023-05-05 RX ADMIN — ACETAMINOPHEN 650 MG: 325 TABLET ORAL at 13:10

## 2023-05-05 RX ADMIN — Medication 12.5 MG: at 08:56

## 2023-05-05 RX ADMIN — FUROSEMIDE 80 MG: 10 INJECTION, SOLUTION INTRAMUSCULAR; INTRAVENOUS at 14:45

## 2023-05-05 RX ADMIN — FUROSEMIDE 80 MG: 10 INJECTION, SOLUTION INTRAMUSCULAR; INTRAVENOUS at 21:00

## 2023-05-05 RX ADMIN — ANTI-FUNGAL POWDER MICONAZOLE NITRATE TALC FREE: 1.42 POWDER TOPICAL at 21:05

## 2023-05-05 RX ADMIN — ROPINIROLE HYDROCHLORIDE 4 MG: 2 TABLET, FILM COATED ORAL at 21:00

## 2023-05-05 RX ADMIN — DEXMEDETOMIDINE 0.5 MCG/KG/HR: 100 INJECTION, SOLUTION INTRAVENOUS at 18:36

## 2023-05-05 RX ADMIN — ASPIRIN 81 MG: 81 TABLET, COATED ORAL at 08:56

## 2023-05-05 RX ADMIN — FUROSEMIDE 80 MG: 10 INJECTION, SOLUTION INTRAMUSCULAR; INTRAVENOUS at 08:56

## 2023-05-05 RX ADMIN — Medication 100 MG: at 08:56

## 2023-05-05 RX ADMIN — FOLIC ACID 1 MG: 1 TABLET ORAL at 08:56

## 2023-05-05 RX ADMIN — DOCUSATE SODIUM 100 MG: 100 CAPSULE, LIQUID FILLED ORAL at 14:45

## 2023-05-05 RX ADMIN — LORAZEPAM 0.5 MG: 0.5 TABLET ORAL at 13:10

## 2023-05-05 RX ADMIN — ACETAMINOPHEN 650 MG: 325 TABLET ORAL at 08:55

## 2023-05-05 RX ADMIN — IPRATROPIUM BROMIDE AND ALBUTEROL SULFATE 1 AMPULE: 2.5; .5 SOLUTION RESPIRATORY (INHALATION) at 11:36

## 2023-05-05 RX ADMIN — Medication 6 MG: at 21:00

## 2023-05-05 RX ADMIN — SODIUM CHLORIDE: 9 INJECTION, SOLUTION INTRAVENOUS at 05:23

## 2023-05-05 RX ADMIN — WATER 40 MG: 1 INJECTION INTRAMUSCULAR; INTRAVENOUS; SUBCUTANEOUS at 18:45

## 2023-05-05 RX ADMIN — POLYETHYLENE GLYCOL 3350 17 G: 17 POWDER, FOR SOLUTION ORAL at 14:45

## 2023-05-05 RX ADMIN — BUDESONIDE AND FORMOTEROL FUMARATE DIHYDRATE 2 PUFF: 160; 4.5 AEROSOL RESPIRATORY (INHALATION) at 07:32

## 2023-05-05 RX ADMIN — METOLAZONE 2.5 MG: 2.5 TABLET ORAL at 08:56

## 2023-05-05 RX ADMIN — FERROUS SULFATE TAB EC 325 MG (65 MG FE EQUIVALENT) 325 MG: 325 (65 FE) TABLET DELAYED RESPONSE at 08:56

## 2023-05-05 RX ADMIN — ACETAMINOPHEN 650 MG: 325 TABLET ORAL at 22:09

## 2023-05-05 RX ADMIN — DEXMEDETOMIDINE 0.5 MCG/KG/HR: 100 INJECTION, SOLUTION INTRAVENOUS at 05:19

## 2023-05-05 ASSESSMENT — PAIN - FUNCTIONAL ASSESSMENT
PAIN_FUNCTIONAL_ASSESSMENT: PREVENTS OR INTERFERES SOME ACTIVE ACTIVITIES AND ADLS
PAIN_FUNCTIONAL_ASSESSMENT: PREVENTS OR INTERFERES SOME ACTIVE ACTIVITIES AND ADLS

## 2023-05-05 ASSESSMENT — PAIN DESCRIPTION - DESCRIPTORS
DESCRIPTORS: ACHING;DULL
DESCRIPTORS: OTHER (COMMENT)
DESCRIPTORS: DISCOMFORT

## 2023-05-05 ASSESSMENT — PAIN DESCRIPTION - ONSET: ONSET: GRADUAL

## 2023-05-05 ASSESSMENT — PAIN DESCRIPTION - LOCATION
LOCATION: SHOULDER
LOCATION: HEAD
LOCATION: SHOULDER

## 2023-05-05 ASSESSMENT — PAIN DESCRIPTION - ORIENTATION
ORIENTATION: RIGHT
ORIENTATION: RIGHT

## 2023-05-05 ASSESSMENT — PAIN SCALES - GENERAL
PAINLEVEL_OUTOF10: 0
PAINLEVEL_OUTOF10: 2
PAINLEVEL_OUTOF10: 5
PAINLEVEL_OUTOF10: 4

## 2023-05-05 ASSESSMENT — PAIN DESCRIPTION - PAIN TYPE: TYPE: CHRONIC PAIN

## 2023-05-05 ASSESSMENT — PAIN DESCRIPTION - FREQUENCY: FREQUENCY: INTERMITTENT

## 2023-05-05 ASSESSMENT — ENCOUNTER SYMPTOMS
RESPIRATORY NEGATIVE: 1
GASTROINTESTINAL NEGATIVE: 1

## 2023-05-06 LAB
ABSOLUTE EOS #: 0 K/UL (ref 0–0.44)
ABSOLUTE IMMATURE GRANULOCYTE: 0.08 K/UL (ref 0–0.3)
ABSOLUTE LYMPH #: 0.3 K/UL (ref 1.1–3.7)
ABSOLUTE MONO #: 0.38 K/UL (ref 0.1–1.2)
ANION GAP SERPL CALCULATED.3IONS-SCNC: 14 MMOL/L (ref 9–17)
BASOPHILS # BLD: 0 % (ref 0–2)
BASOPHILS ABSOLUTE: 0 K/UL (ref 0–0.2)
BUN SERPL-MCNC: 78 MG/DL (ref 6–20)
BUN/CREAT BLD: 48 (ref 9–20)
CALCIUM SERPL-MCNC: 9.4 MG/DL (ref 8.6–10.4)
CHLORIDE SERPL-SCNC: 99 MMOL/L (ref 98–107)
CO2 SERPL-SCNC: 29 MMOL/L (ref 20–31)
CREAT SERPL-MCNC: 1.63 MG/DL (ref 0.7–1.2)
EOSINOPHILS RELATIVE PERCENT: 0 % (ref 1–4)
GFR SERPL CREATININE-BSD FRML MDRD: 50 ML/MIN/1.73M2
GLUCOSE BLD-MCNC: 130 MG/DL (ref 75–110)
GLUCOSE BLD-MCNC: 148 MG/DL (ref 75–110)
GLUCOSE BLD-MCNC: 151 MG/DL (ref 75–110)
GLUCOSE BLD-MCNC: 185 MG/DL (ref 75–110)
GLUCOSE SERPL-MCNC: 191 MG/DL (ref 70–99)
HCT VFR BLD AUTO: 26.7 % (ref 40.7–50.3)
HGB BLD-MCNC: 8.2 G/DL (ref 13–17)
IMMATURE GRANULOCYTES: 1 %
LYMPHOCYTES # BLD: 4 % (ref 24–43)
MAGNESIUM SERPL-MCNC: 2.4 MG/DL (ref 1.6–2.6)
MCH RBC QN AUTO: 29.2 PG (ref 25.2–33.5)
MCHC RBC AUTO-ENTMCNC: 30.7 G/DL (ref 28.4–34.8)
MCV RBC AUTO: 95 FL (ref 82.6–102.9)
MONOCYTES # BLD: 5 % (ref 3–12)
NRBC AUTOMATED: 0 PER 100 WBC
PDW BLD-RTO: 14.1 % (ref 11.8–14.4)
PLATELET # BLD AUTO: 96 K/UL (ref 138–453)
PMV BLD AUTO: 10.3 FL (ref 8.1–13.5)
POTASSIUM SERPL-SCNC: 4 MMOL/L (ref 3.7–5.3)
RBC # BLD: 2.81 M/UL (ref 4.21–5.77)
SEG NEUTROPHILS: 90 % (ref 36–65)
SEGMENTED NEUTROPHILS ABSOLUTE COUNT: 6.84 K/UL (ref 1.5–8.1)
SODIUM SERPL-SCNC: 142 MMOL/L (ref 135–144)
WBC # BLD AUTO: 7.6 K/UL (ref 3.5–11.3)

## 2023-05-06 PROCEDURE — 6370000000 HC RX 637 (ALT 250 FOR IP): Performed by: INTERNAL MEDICINE

## 2023-05-06 PROCEDURE — 2000000000 HC ICU R&B

## 2023-05-06 PROCEDURE — 94640 AIRWAY INHALATION TREATMENT: CPT

## 2023-05-06 PROCEDURE — 2580000003 HC RX 258: Performed by: INTERNAL MEDICINE

## 2023-05-06 PROCEDURE — 2700000000 HC OXYGEN THERAPY PER DAY

## 2023-05-06 PROCEDURE — 99232 SBSQ HOSP IP/OBS MODERATE 35: CPT | Performed by: INTERNAL MEDICINE

## 2023-05-06 PROCEDURE — 99232 SBSQ HOSP IP/OBS MODERATE 35: CPT | Performed by: STUDENT IN AN ORGANIZED HEALTH CARE EDUCATION/TRAINING PROGRAM

## 2023-05-06 PROCEDURE — 85025 COMPLETE CBC W/AUTO DIFF WBC: CPT

## 2023-05-06 PROCEDURE — 36415 COLL VENOUS BLD VENIPUNCTURE: CPT

## 2023-05-06 PROCEDURE — 6370000000 HC RX 637 (ALT 250 FOR IP): Performed by: NURSE PRACTITIONER

## 2023-05-06 PROCEDURE — 6360000002 HC RX W HCPCS: Performed by: INTERNAL MEDICINE

## 2023-05-06 PROCEDURE — 80048 BASIC METABOLIC PNL TOTAL CA: CPT

## 2023-05-06 PROCEDURE — 94761 N-INVAS EAR/PLS OXIMETRY MLT: CPT

## 2023-05-06 PROCEDURE — 6360000002 HC RX W HCPCS: Performed by: NURSE PRACTITIONER

## 2023-05-06 PROCEDURE — 2500000003 HC RX 250 WO HCPCS: Performed by: INTERNAL MEDICINE

## 2023-05-06 PROCEDURE — 83735 ASSAY OF MAGNESIUM: CPT

## 2023-05-06 PROCEDURE — 6370000000 HC RX 637 (ALT 250 FOR IP): Performed by: STUDENT IN AN ORGANIZED HEALTH CARE EDUCATION/TRAINING PROGRAM

## 2023-05-06 PROCEDURE — 99232 SBSQ HOSP IP/OBS MODERATE 35: CPT | Performed by: PSYCHIATRY & NEUROLOGY

## 2023-05-06 PROCEDURE — 82947 ASSAY GLUCOSE BLOOD QUANT: CPT

## 2023-05-06 RX ORDER — FUROSEMIDE 10 MG/ML
80 INJECTION INTRAMUSCULAR; INTRAVENOUS 2 TIMES DAILY
Status: DISCONTINUED | OUTPATIENT
Start: 2023-05-06 | End: 2023-05-07

## 2023-05-06 RX ADMIN — SPIRONOLACTONE 25 MG: 25 TABLET ORAL at 09:27

## 2023-05-06 RX ADMIN — DEXMEDETOMIDINE 0.4 MCG/KG/HR: 100 INJECTION, SOLUTION INTRAVENOUS at 03:11

## 2023-05-06 RX ADMIN — FLUTICASONE PROPIONATE 1 SPRAY: 50 SPRAY, METERED NASAL at 09:28

## 2023-05-06 RX ADMIN — Medication 100 MG: at 09:27

## 2023-05-06 RX ADMIN — ALBUTEROL SULFATE 2.5 MG: 2.5 SOLUTION RESPIRATORY (INHALATION) at 08:26

## 2023-05-06 RX ADMIN — ANTI-FUNGAL POWDER MICONAZOLE NITRATE TALC FREE: 1.42 POWDER TOPICAL at 23:16

## 2023-05-06 RX ADMIN — ALBUTEROL SULFATE 2.5 MG: 2.5 SOLUTION RESPIRATORY (INHALATION) at 03:17

## 2023-05-06 RX ADMIN — Medication 6 MG: at 20:40

## 2023-05-06 RX ADMIN — BUDESONIDE AND FORMOTEROL FUMARATE DIHYDRATE 2 PUFF: 160; 4.5 AEROSOL RESPIRATORY (INHALATION) at 08:29

## 2023-05-06 RX ADMIN — FUROSEMIDE 80 MG: 10 INJECTION, SOLUTION INTRAMUSCULAR; INTRAVENOUS at 09:27

## 2023-05-06 RX ADMIN — DEXMEDETOMIDINE 0.5 MCG/KG/HR: 100 INJECTION, SOLUTION INTRAVENOUS at 16:08

## 2023-05-06 RX ADMIN — POLYETHYLENE GLYCOL 3350 17 G: 17 POWDER, FOR SOLUTION ORAL at 09:27

## 2023-05-06 RX ADMIN — FUROSEMIDE 80 MG: 10 INJECTION, SOLUTION INTRAMUSCULAR; INTRAVENOUS at 18:03

## 2023-05-06 RX ADMIN — FERROUS SULFATE TAB EC 325 MG (65 MG FE EQUIVALENT) 325 MG: 325 (65 FE) TABLET DELAYED RESPONSE at 10:04

## 2023-05-06 RX ADMIN — ROPINIROLE HYDROCHLORIDE 4 MG: 2 TABLET, FILM COATED ORAL at 20:41

## 2023-05-06 RX ADMIN — FOLIC ACID 1 MG: 1 TABLET ORAL at 09:27

## 2023-05-06 RX ADMIN — DOCUSATE SODIUM 100 MG: 100 CAPSULE, LIQUID FILLED ORAL at 09:27

## 2023-05-06 RX ADMIN — ANTI-FUNGAL POWDER MICONAZOLE NITRATE TALC FREE: 1.42 POWDER TOPICAL at 09:28

## 2023-05-06 RX ADMIN — METOLAZONE 2.5 MG: 2.5 TABLET ORAL at 09:27

## 2023-05-06 RX ADMIN — ACETAMINOPHEN 650 MG: 325 TABLET ORAL at 11:20

## 2023-05-06 RX ADMIN — ACETAMINOPHEN 650 MG: 325 TABLET ORAL at 21:10

## 2023-05-06 RX ADMIN — MONTELUKAST 10 MG: 10 TABLET, FILM COATED ORAL at 20:41

## 2023-05-06 RX ADMIN — ASPIRIN 81 MG: 81 TABLET, COATED ORAL at 09:27

## 2023-05-06 RX ADMIN — BUDESONIDE AND FORMOTEROL FUMARATE DIHYDRATE 2 PUFF: 160; 4.5 AEROSOL RESPIRATORY (INHALATION) at 20:08

## 2023-05-06 ASSESSMENT — ENCOUNTER SYMPTOMS
GASTROINTESTINAL NEGATIVE: 1
SHORTNESS OF BREATH: 1

## 2023-05-06 ASSESSMENT — PAIN DESCRIPTION - ORIENTATION
ORIENTATION: RIGHT
ORIENTATION: RIGHT

## 2023-05-06 ASSESSMENT — PAIN DESCRIPTION - LOCATION
LOCATION: SHOULDER
LOCATION: SHOULDER

## 2023-05-06 ASSESSMENT — PAIN DESCRIPTION - DESCRIPTORS: DESCRIPTORS: ACHING

## 2023-05-06 ASSESSMENT — PAIN SCALES - GENERAL
PAINLEVEL_OUTOF10: 3
PAINLEVEL_OUTOF10: 4

## 2023-05-07 ENCOUNTER — APPOINTMENT (OUTPATIENT)
Dept: GENERAL RADIOLOGY | Age: 53
DRG: 291 | End: 2023-05-07
Payer: COMMERCIAL

## 2023-05-07 LAB
ABSOLUTE EOS #: 0.07 K/UL (ref 0–0.44)
ABSOLUTE IMMATURE GRANULOCYTE: 0.03 K/UL (ref 0–0.3)
ABSOLUTE LYMPH #: 1.04 K/UL (ref 1.1–3.7)
ABSOLUTE MONO #: 0.67 K/UL (ref 0.1–1.2)
ANION GAP SERPL CALCULATED.3IONS-SCNC: 10 MMOL/L (ref 9–17)
ANION GAP SERPL CALCULATED.3IONS-SCNC: 13 MMOL/L (ref 9–17)
BASOPHILS # BLD: 0 % (ref 0–2)
BASOPHILS ABSOLUTE: <0.03 K/UL (ref 0–0.2)
BUN SERPL-MCNC: 66 MG/DL (ref 6–20)
BUN SERPL-MCNC: 76 MG/DL (ref 6–20)
BUN/CREAT BLD: 52 (ref 9–20)
BUN/CREAT BLD: 57 (ref 9–20)
CALCIUM SERPL-MCNC: 9.7 MG/DL (ref 8.6–10.4)
CALCIUM SERPL-MCNC: 9.9 MG/DL (ref 8.6–10.4)
CHLORIDE SERPL-SCNC: 95 MMOL/L (ref 98–107)
CHLORIDE SERPL-SCNC: 98 MMOL/L (ref 98–107)
CO2 SERPL-SCNC: 34 MMOL/L (ref 20–31)
CO2 SERPL-SCNC: 37 MMOL/L (ref 20–31)
CREAT SERPL-MCNC: 1.27 MG/DL (ref 0.7–1.2)
CREAT SERPL-MCNC: 1.34 MG/DL (ref 0.7–1.2)
EOSINOPHILS RELATIVE PERCENT: 1 % (ref 1–4)
GFR SERPL CREATININE-BSD FRML MDRD: >60 ML/MIN/1.73M2
GFR SERPL CREATININE-BSD FRML MDRD: >60 ML/MIN/1.73M2
GLUCOSE BLD-MCNC: 112 MG/DL (ref 75–110)
GLUCOSE BLD-MCNC: 124 MG/DL (ref 75–110)
GLUCOSE BLD-MCNC: 159 MG/DL (ref 75–110)
GLUCOSE BLD-MCNC: 167 MG/DL (ref 75–110)
GLUCOSE SERPL-MCNC: 142 MG/DL (ref 70–99)
GLUCOSE SERPL-MCNC: 184 MG/DL (ref 70–99)
HCT VFR BLD AUTO: 26.4 % (ref 40.7–50.3)
HGB BLD-MCNC: 7.9 G/DL (ref 13–17)
IMMATURE GRANULOCYTES: 1 %
LYMPHOCYTES # BLD: 16 % (ref 24–43)
MAGNESIUM SERPL-MCNC: 2.2 MG/DL (ref 1.6–2.6)
MCH RBC QN AUTO: 28.6 PG (ref 25.2–33.5)
MCHC RBC AUTO-ENTMCNC: 29.9 G/DL (ref 28.4–34.8)
MCV RBC AUTO: 95.7 FL (ref 82.6–102.9)
MONOCYTES # BLD: 10 % (ref 3–12)
NRBC AUTOMATED: 0 PER 100 WBC
PDW BLD-RTO: 14.1 % (ref 11.8–14.4)
PLATELET # BLD AUTO: 97 K/UL (ref 138–453)
PMV BLD AUTO: 9.8 FL (ref 8.1–13.5)
POTASSIUM SERPL-SCNC: 3.4 MMOL/L (ref 3.7–5.3)
POTASSIUM SERPL-SCNC: 4.2 MMOL/L (ref 3.7–5.3)
RBC # BLD: 2.76 M/UL (ref 4.21–5.77)
SEG NEUTROPHILS: 72 % (ref 36–65)
SEGMENTED NEUTROPHILS ABSOLUTE COUNT: 4.73 K/UL (ref 1.5–8.1)
SODIUM SERPL-SCNC: 142 MMOL/L (ref 135–144)
SODIUM SERPL-SCNC: 145 MMOL/L (ref 135–144)
WBC # BLD AUTO: 6.5 K/UL (ref 3.5–11.3)

## 2023-05-07 PROCEDURE — 36415 COLL VENOUS BLD VENIPUNCTURE: CPT

## 2023-05-07 PROCEDURE — 82947 ASSAY GLUCOSE BLOOD QUANT: CPT

## 2023-05-07 PROCEDURE — 6370000000 HC RX 637 (ALT 250 FOR IP): Performed by: NURSE PRACTITIONER

## 2023-05-07 PROCEDURE — 99232 SBSQ HOSP IP/OBS MODERATE 35: CPT | Performed by: STUDENT IN AN ORGANIZED HEALTH CARE EDUCATION/TRAINING PROGRAM

## 2023-05-07 PROCEDURE — 6370000000 HC RX 637 (ALT 250 FOR IP): Performed by: INTERNAL MEDICINE

## 2023-05-07 PROCEDURE — 6360000002 HC RX W HCPCS: Performed by: INTERNAL MEDICINE

## 2023-05-07 PROCEDURE — 80048 BASIC METABOLIC PNL TOTAL CA: CPT

## 2023-05-07 PROCEDURE — 85025 COMPLETE CBC W/AUTO DIFF WBC: CPT

## 2023-05-07 PROCEDURE — 6360000002 HC RX W HCPCS: Performed by: NURSE PRACTITIONER

## 2023-05-07 PROCEDURE — 94640 AIRWAY INHALATION TREATMENT: CPT

## 2023-05-07 PROCEDURE — 94761 N-INVAS EAR/PLS OXIMETRY MLT: CPT

## 2023-05-07 PROCEDURE — 83735 ASSAY OF MAGNESIUM: CPT

## 2023-05-07 PROCEDURE — 2500000003 HC RX 250 WO HCPCS: Performed by: INTERNAL MEDICINE

## 2023-05-07 PROCEDURE — 71045 X-RAY EXAM CHEST 1 VIEW: CPT

## 2023-05-07 PROCEDURE — 2580000003 HC RX 258: Performed by: INTERNAL MEDICINE

## 2023-05-07 PROCEDURE — 2000000000 HC ICU R&B

## 2023-05-07 PROCEDURE — 6370000000 HC RX 637 (ALT 250 FOR IP): Performed by: STUDENT IN AN ORGANIZED HEALTH CARE EDUCATION/TRAINING PROGRAM

## 2023-05-07 RX ORDER — LANOLIN ALCOHOL/MO/W.PET/CERES
CREAM (GRAM) TOPICAL 2 TIMES DAILY
Status: DISCONTINUED | OUTPATIENT
Start: 2023-05-07 | End: 2023-05-08 | Stop reason: HOSPADM

## 2023-05-07 RX ORDER — LANOLIN ALCOHOL/MO/W.PET/CERES
CREAM (GRAM) TOPICAL ONCE
Status: COMPLETED | OUTPATIENT
Start: 2023-05-07 | End: 2023-05-07

## 2023-05-07 RX ORDER — QUETIAPINE FUMARATE 25 MG/1
25 TABLET, FILM COATED ORAL 2 TIMES DAILY
Status: DISCONTINUED | OUTPATIENT
Start: 2023-05-07 | End: 2023-05-08 | Stop reason: HOSPADM

## 2023-05-07 RX ADMIN — FLUTICASONE PROPIONATE 1 SPRAY: 50 SPRAY, METERED NASAL at 08:07

## 2023-05-07 RX ADMIN — Medication 6 MG: at 20:24

## 2023-05-07 RX ADMIN — FOLIC ACID 1 MG: 1 TABLET ORAL at 08:07

## 2023-05-07 RX ADMIN — HEPARIN SODIUM 5000 UNITS: 5000 INJECTION INTRAVENOUS; SUBCUTANEOUS at 20:25

## 2023-05-07 RX ADMIN — Medication 100 MG: at 08:07

## 2023-05-07 RX ADMIN — BUDESONIDE AND FORMOTEROL FUMARATE DIHYDRATE 2 PUFF: 160; 4.5 AEROSOL RESPIRATORY (INHALATION) at 08:30

## 2023-05-07 RX ADMIN — BUDESONIDE AND FORMOTEROL FUMARATE DIHYDRATE 2 PUFF: 160; 4.5 AEROSOL RESPIRATORY (INHALATION) at 19:32

## 2023-05-07 RX ADMIN — DEXMEDETOMIDINE 0.5 MCG/KG/HR: 100 INJECTION, SOLUTION INTRAVENOUS at 20:29

## 2023-05-07 RX ADMIN — HEPARIN SODIUM 5000 UNITS: 5000 INJECTION INTRAVENOUS; SUBCUTANEOUS at 15:33

## 2023-05-07 RX ADMIN — POLYETHYLENE GLYCOL 3350 17 G: 17 POWDER, FOR SOLUTION ORAL at 08:07

## 2023-05-07 RX ADMIN — ASPIRIN 81 MG: 81 TABLET, COATED ORAL at 08:07

## 2023-05-07 RX ADMIN — QUETIAPINE FUMARATE 25 MG: 25 TABLET ORAL at 20:25

## 2023-05-07 RX ADMIN — DOCUSATE SODIUM 100 MG: 100 CAPSULE, LIQUID FILLED ORAL at 08:07

## 2023-05-07 RX ADMIN — SKIN PROTECTANT: 33 OINTMENT TOPICAL at 15:50

## 2023-05-07 RX ADMIN — POTASSIUM CHLORIDE 40 MEQ: 1500 TABLET, EXTENDED RELEASE ORAL at 05:43

## 2023-05-07 RX ADMIN — SPIRONOLACTONE 25 MG: 25 TABLET ORAL at 08:07

## 2023-05-07 RX ADMIN — MONTELUKAST 10 MG: 10 TABLET, FILM COATED ORAL at 20:25

## 2023-05-07 RX ADMIN — ROPINIROLE HYDROCHLORIDE 4 MG: 2 TABLET, FILM COATED ORAL at 20:24

## 2023-05-07 RX ADMIN — FERROUS SULFATE TAB EC 325 MG (65 MG FE EQUIVALENT) 325 MG: 325 (65 FE) TABLET DELAYED RESPONSE at 08:07

## 2023-05-07 RX ADMIN — ANTI-FUNGAL POWDER MICONAZOLE NITRATE TALC FREE: 1.42 POWDER TOPICAL at 08:09

## 2023-05-07 RX ADMIN — FUROSEMIDE 80 MG: 10 INJECTION, SOLUTION INTRAMUSCULAR; INTRAVENOUS at 08:07

## 2023-05-07 RX ADMIN — ACETAMINOPHEN 650 MG: 325 TABLET ORAL at 01:42

## 2023-05-07 RX ADMIN — DEXMEDETOMIDINE 0.5 MCG/KG/HR: 100 INJECTION, SOLUTION INTRAVENOUS at 07:14

## 2023-05-07 ASSESSMENT — PAIN DESCRIPTION - DESCRIPTORS: DESCRIPTORS: ACHING

## 2023-05-07 ASSESSMENT — PAIN DESCRIPTION - LOCATION: LOCATION: SHOULDER

## 2023-05-07 ASSESSMENT — PAIN SCALES - GENERAL: PAINLEVEL_OUTOF10: 4

## 2023-05-07 ASSESSMENT — PAIN DESCRIPTION - ORIENTATION: ORIENTATION: RIGHT

## 2023-05-08 VITALS
HEART RATE: 76 BPM | HEIGHT: 72 IN | DIASTOLIC BLOOD PRESSURE: 79 MMHG | SYSTOLIC BLOOD PRESSURE: 122 MMHG | WEIGHT: 315 LBS | OXYGEN SATURATION: 100 % | BODY MASS INDEX: 42.66 KG/M2 | RESPIRATION RATE: 19 BRPM | TEMPERATURE: 97.7 F

## 2023-05-08 LAB
ANION GAP SERPL CALCULATED.3IONS-SCNC: 12 MMOL/L (ref 9–17)
BUN SERPL-MCNC: 59 MG/DL (ref 6–20)
BUN/CREAT BLD: 53 (ref 9–20)
CALCIUM SERPL-MCNC: 9.9 MG/DL (ref 8.6–10.4)
CHLORIDE SERPL-SCNC: 96 MMOL/L (ref 98–107)
CO2 SERPL-SCNC: 35 MMOL/L (ref 20–31)
CREAT SERPL-MCNC: 1.12 MG/DL (ref 0.7–1.2)
GFR SERPL CREATININE-BSD FRML MDRD: >60 ML/MIN/1.73M2
GLUCOSE BLD-MCNC: 105 MG/DL (ref 75–110)
GLUCOSE BLD-MCNC: 135 MG/DL (ref 75–110)
GLUCOSE BLD-MCNC: 93 MG/DL (ref 75–110)
GLUCOSE SERPL-MCNC: 141 MG/DL (ref 70–99)
MAGNESIUM SERPL-MCNC: 1.9 MG/DL (ref 1.6–2.6)
MICROORGANISM SPEC CULT: NORMAL
POTASSIUM SERPL-SCNC: 3.6 MMOL/L (ref 3.7–5.3)
SERVICE CMNT-IMP: NORMAL
SODIUM SERPL-SCNC: 143 MMOL/L (ref 135–144)
SPECIMEN DESCRIPTION: NORMAL

## 2023-05-08 PROCEDURE — 97530 THERAPEUTIC ACTIVITIES: CPT

## 2023-05-08 PROCEDURE — 51798 US URINE CAPACITY MEASURE: CPT

## 2023-05-08 PROCEDURE — 83735 ASSAY OF MAGNESIUM: CPT

## 2023-05-08 PROCEDURE — 6370000000 HC RX 637 (ALT 250 FOR IP): Performed by: NURSE PRACTITIONER

## 2023-05-08 PROCEDURE — 6360000002 HC RX W HCPCS: Performed by: NURSE PRACTITIONER

## 2023-05-08 PROCEDURE — 99232 SBSQ HOSP IP/OBS MODERATE 35: CPT | Performed by: STUDENT IN AN ORGANIZED HEALTH CARE EDUCATION/TRAINING PROGRAM

## 2023-05-08 PROCEDURE — 94640 AIRWAY INHALATION TREATMENT: CPT

## 2023-05-08 PROCEDURE — 94761 N-INVAS EAR/PLS OXIMETRY MLT: CPT

## 2023-05-08 PROCEDURE — 97110 THERAPEUTIC EXERCISES: CPT

## 2023-05-08 PROCEDURE — 6370000000 HC RX 637 (ALT 250 FOR IP): Performed by: STUDENT IN AN ORGANIZED HEALTH CARE EDUCATION/TRAINING PROGRAM

## 2023-05-08 PROCEDURE — 36415 COLL VENOUS BLD VENIPUNCTURE: CPT

## 2023-05-08 PROCEDURE — 6370000000 HC RX 637 (ALT 250 FOR IP): Performed by: INTERNAL MEDICINE

## 2023-05-08 PROCEDURE — 80048 BASIC METABOLIC PNL TOTAL CA: CPT

## 2023-05-08 PROCEDURE — 97116 GAIT TRAINING THERAPY: CPT

## 2023-05-08 PROCEDURE — 82947 ASSAY GLUCOSE BLOOD QUANT: CPT

## 2023-05-08 PROCEDURE — 51702 INSERT TEMP BLADDER CATH: CPT

## 2023-05-08 PROCEDURE — 2500000003 HC RX 250 WO HCPCS: Performed by: INTERNAL MEDICINE

## 2023-05-08 PROCEDURE — 2580000003 HC RX 258: Performed by: INTERNAL MEDICINE

## 2023-05-08 PROCEDURE — 97535 SELF CARE MNGMENT TRAINING: CPT

## 2023-05-08 RX ORDER — SPIRONOLACTONE 25 MG/1
25 TABLET ORAL DAILY
Qty: 30 TABLET | Refills: 3 | Status: SHIPPED | OUTPATIENT
Start: 2023-05-09

## 2023-05-08 RX ORDER — TORSEMIDE 20 MG/1
20 TABLET ORAL DAILY
Qty: 30 TABLET | Refills: 3 | Status: SHIPPED | OUTPATIENT
Start: 2023-05-09

## 2023-05-08 RX ORDER — POTASSIUM CHLORIDE 20 MEQ/1
10 TABLET, EXTENDED RELEASE ORAL
Status: DISCONTINUED | OUTPATIENT
Start: 2023-05-08 | End: 2023-05-08 | Stop reason: HOSPADM

## 2023-05-08 RX ORDER — THIAMINE MONONITRATE (VIT B1) 100 MG
100 TABLET ORAL DAILY
Qty: 30 TABLET | Refills: 3 | Status: SHIPPED | OUTPATIENT
Start: 2023-05-09

## 2023-05-08 RX ORDER — FOLIC ACID 1 MG/1
1 TABLET ORAL DAILY
Qty: 30 TABLET | Refills: 3 | Status: SHIPPED | OUTPATIENT
Start: 2023-05-09

## 2023-05-08 RX ORDER — TORSEMIDE 20 MG/1
20 TABLET ORAL DAILY
Status: DISCONTINUED | OUTPATIENT
Start: 2023-05-08 | End: 2023-05-08 | Stop reason: HOSPADM

## 2023-05-08 RX ADMIN — FERROUS SULFATE TAB EC 325 MG (65 MG FE EQUIVALENT) 325 MG: 325 (65 FE) TABLET DELAYED RESPONSE at 08:26

## 2023-05-08 RX ADMIN — SPIRONOLACTONE 25 MG: 25 TABLET ORAL at 08:27

## 2023-05-08 RX ADMIN — ANTI-FUNGAL POWDER MICONAZOLE NITRATE TALC FREE: 1.42 POWDER TOPICAL at 08:28

## 2023-05-08 RX ADMIN — TORSEMIDE 20 MG: 20 TABLET ORAL at 09:54

## 2023-05-08 RX ADMIN — ASPIRIN 81 MG: 81 TABLET, COATED ORAL at 08:27

## 2023-05-08 RX ADMIN — POTASSIUM CHLORIDE 10 MEQ: 1500 TABLET, EXTENDED RELEASE ORAL at 09:51

## 2023-05-08 RX ADMIN — ACETAMINOPHEN 650 MG: 325 TABLET ORAL at 08:26

## 2023-05-08 RX ADMIN — DOCUSATE SODIUM 100 MG: 100 CAPSULE, LIQUID FILLED ORAL at 08:27

## 2023-05-08 RX ADMIN — BUDESONIDE AND FORMOTEROL FUMARATE DIHYDRATE 2 PUFF: 160; 4.5 AEROSOL RESPIRATORY (INHALATION) at 08:01

## 2023-05-08 RX ADMIN — FOLIC ACID 1 MG: 1 TABLET ORAL at 08:27

## 2023-05-08 RX ADMIN — HEPARIN SODIUM 5000 UNITS: 5000 INJECTION INTRAVENOUS; SUBCUTANEOUS at 05:43

## 2023-05-08 RX ADMIN — SKIN PROTECTANT: 33 OINTMENT TOPICAL at 08:30

## 2023-05-08 RX ADMIN — QUETIAPINE FUMARATE 25 MG: 25 TABLET ORAL at 08:26

## 2023-05-08 RX ADMIN — Medication 100 MG: at 08:25

## 2023-05-08 RX ADMIN — DEXMEDETOMIDINE 0.3 MCG/KG/HR: 100 INJECTION, SOLUTION INTRAVENOUS at 08:54

## 2023-05-08 RX ADMIN — FLUTICASONE PROPIONATE 1 SPRAY: 50 SPRAY, METERED NASAL at 08:31

## 2023-05-08 NOTE — PROGRESS NOTES
End Of Shift Note  [facility]  Summary of shift: At beginning of shift pt was in chair. Assisted back to bed with moderate assistance x 2 and pt slept a solid 5 hours while utilizing his home CPAP. Pt became uncomfortable in bed and got back up to the chair with minimal assistance x 2 and slept off and on for the remainder of the shift. Pt continues to be on Precedex drip at 0.5 mcg.      Vitals:    Vitals:    05/08/23 0200 05/08/23 0400 05/08/23 0500 05/08/23 0600   BP:       Pulse: 67 56 66 56   Resp: 15 15 20    Temp:  97.7 °F (36.5 °C)     TempSrc:  Temporal     SpO2: 93% (!) 85% 94%    Weight:       Height:            I&O:   Intake/Output Summary (Last 24 hours) at 5/8/2023 0607  Last data filed at 5/8/2023 0604  Gross per 24 hour   Intake 1368.25 ml   Output 7100 ml   Net -5731.75 ml       Resp Status: Pt on room air    Ventilator Settings:     / / /FiO2 : 30 %    Critical Care IV infusions:   dexmedetomidine (PRECEDEX) IV infusion 0.5 mcg/kg/hr (05/08/23 0604)    sodium chloride Stopped (05/05/23 1000)    dextrose          LDA:   Peripheral IV 05/02/23 Right;Ventral Forearm (Active)   Number of days: 5       Peripheral IV 05/03/23 Right;Posterior Forearm (Active)   Number of days: 4       Urinary Catheter 05/02/23 Other (comment) (Active)   Number of days: 5
Kathleen Fabian   Urology Progress Note            Subjective: Follow-up urinary retention enlarged prostate    Patient Vitals for the past 24 hrs:   BP Temp Temp src Pulse Resp SpO2   05/08/23 0600 -- -- -- 56 -- --   05/08/23 0500 -- -- -- 66 20 94 %   05/08/23 0400 -- 97.7 °F (36.5 °C) Temporal 56 15 (!) 85 %   05/08/23 0200 -- -- -- 67 15 93 %   05/08/23 0110 -- -- -- 68 25 95 %   05/08/23 0100 -- -- -- 58 15 (!) 89 %   05/08/23 0000 -- 98.2 °F (36.8 °C) Temporal 59 14 90 %   05/07/23 2200 122/67 -- -- 63 18 90 %   05/07/23 2000 -- 98.1 °F (36.7 °C) Temporal 71 22 96 %   05/07/23 1934 -- -- -- 64 16 96 %   05/07/23 1700 116/67 -- -- 62 13 --   05/07/23 1600 119/74 98 °F (36.7 °C) Temporal 67 24 95 %   05/07/23 1300 (!) 148/86 -- -- 65 16 --   05/07/23 1240 135/75 -- -- 67 20 --   05/07/23 1200 -- 98.7 °F (37.1 °C) Temporal -- -- 97 %   05/07/23 1145 132/75 -- -- 66 27 --   05/07/23 1000 135/84 -- -- 67 19 --   05/07/23 0900 131/84 -- -- 63 20 --   05/07/23 0832 -- -- -- -- -- 100 %   05/07/23 0800 128/81 98.5 °F (36.9 °C) Temporal 59 20 93 %       Intake/Output Summary (Last 24 hours) at 5/8/2023 0719  Last data filed at 5/8/2023 0604  Gross per 24 hour   Intake 1156.66 ml   Output 6600 ml   Net -5443.34 ml       Recent Labs     05/06/23  0404 05/07/23  0400   WBC 7.6 6.5   HGB 8.2* 7.9*   HCT 26.7* 26.4*   MCV 95.0 95.7   PLT 96* 97*     Recent Labs     05/07/23  0400 05/07/23  1757 05/08/23  0241   * 142 143   K 3.4* 4.2 3.6*   CL 98 95* 96*   CO2 37* 34* 35*   BUN 76* 66* 59*   CREATININE 1.34* 1.27* 1.12       No results for input(s): COLORU, PHUR, LABCAST, WBCUA, RBCUA, MUCUS, TRICHOMONAS, YEAST, BACTERIA, CLARITYU, SPECGRAV, LEUKOCYTESUR, UROBILINOGEN, BILIRUBINUR, BLOODU in the last 72 hours.     Invalid input(s): NITRATE, GLUCOSEUKETONESUAMORPHOUS    Additional Lab/culture results:    Physical Exam: Patient alert oriented not in acute distress sitting at bedside
Occupational Therapy  Facility/Department: [facility]  Rehabilitation Occupational Therapy Daily Treatment Note    Date: 23  Patient Name: Angela Carlos       Room: 0362/3078-96  MRN: [id number]  Account: [de-identified]   : 1970  (48 y.o.) Gender: male      ELIUD Olivares reports patient is medically stable for therapy treatment this date. Chart reviewed prior to treatment and patient is agreeable for therapy. All lines intact and patient positioned comfortably at end of treatment. All patient needs addressed prior to ending therapy session. Pt is currently functional below baseline and recommend comprehensive and intensive skilled therapy by a multidisciplinary team. Would expect patient to be able to tolerate 3 hours of therapy per day and able to tolerate at least one hour up in chair. Please refer to AM-PAC score for current mobility/adl level. Past Medical History:  has a past medical history of Alcohol abuse, Anemia due to stage 3 chronic kidney disease ([address] Arthritis, ED (erectile dysfunction), Essential hypertension, benign, and Unspecified asthma(493.90). Past Surgical History:   has a past surgical history that includes Knee arthroscopy (Left). Restrictions  Restrictions/Precautions: General Precautions, Fall Risk, Seizure  Other position/activity restrictions: Up w/ assist, telemetry, duran cath, RUE IV  Required Braces or Orthoses?: No    Subjective  Subjective: Pt sitting up in recliner upon arrival agreeable to OT. Restrictions/Precautions: General Precautions; Fall Risk;Seizure             Objective               ADL  Lower Extremity Dressing  Skilled Clinical Factors: Pt reported he is able to wiliam/doff pants at home but when asked to doff socks pt had difficulty reaching feet. Was given verbal edu on use of reacher for donning/doffing underwear and pants with him verbalizing understanding.   Putting On/Taking Off Footwear  Assistance Level: Dependent  Skilled Clinical
Per Dr. Linh Gipson, pt can be discharged from nephrology standpoint. R cardiac cath can be done at any point possibly outpatient. Follow-up with pt's nephrologist, or follow-up with Dr. Rj alexander in 4 weeks. Starting a loop diuretic for discharge.
Pt given discharge instructions regarding follow-up, [facility] health, self care, medications with side effects. Pt voices understanding of same. Discharged per wheelchair in care of mother with all belongings.
Pt voided 200 ml, bladder scanned for 45 ml post void residual.
Pulmonary Critical Care Progress Note       Patient seen for the follow up of  Acute on chronic right-sided congestive heart failure ([address]     Subjective:  He has improved shortness of breath. He has occasional dry cough he is anxious on Precedex at 0.5. He had history of alcohol abuse . He denies chest pain. He is on room air. He uses home CPAP for sleep . He has good appetite appetite    Examination:  Vitals: /79   Pulse 76   Temp 97.7 °F (36.5 °C) (Temporal)   Resp 19   Ht 6' (1.829 m)   Wt (!) 340 lb 1 oz (154.3 kg)   SpO2 100%   BMI 46.12 kg/m²   General appearance: Awake, alert and cooperative with exam  Neck: No JVD  Lungs: Decreased breath sounds no crackles or wheezing  Heart: regular rate and rhythm, S1, S2 normal, no gallop  Abdomen: Soft, non tender, + BS  Extremities: no cyanosis or clubbing. +2 edema    LABs:  CBC:   Recent Labs     05/06/23  0404 05/07/23  0400   WBC 7.6 6.5   HGB 8.2* 7.9*   HCT 26.7* 26.4*   PLT 96* 97*       BMP:   Recent Labs     05/06/23  0404 05/07/23  0400 05/07/23  1757 05/08/23  0241    145* 142 143   K 4.0 3.4* 4.2 3.6*   CO2 29 37* 34* 35*   BUN 78* 76* 66* 59*   CREATININE 1.63* 1.34* 1.27* 1.12   LABGLOM 50* >60 >60 >60   GLUCOSE 191* 142* 184* 141*               ABG:  Lab Results   Component Value Date/Time    FIO2 3.0 05/02/2023 09:17 PM       Lab Results   Component Value Date/Time    POCPH 7.384 05/02/2023 09:17 PM    POCPCO2 39.7 05/02/2023 09:17 PM    POCPO2 87.3 05/02/2023 09:17 PM    POCHCO3 23.7 05/02/2023 09:17 PM    VLDR5FCU 96 05/02/2023 09:17 PM    FIO2 3.0 05/02/2023 09:17 PM     Radiology:  X-ray chest 5/7  Mildly improved vascular congestion and interstitial thickening. Unchanged small pleural effusions. CT 5/2/2023      CT soft tissue neck 5/3/2023  IMPRESSION:  No acute abnormality of the visualized soft tissue structures of the neck.       Impression:  Acute hypoxic respiratory insufficiency  Acute on chronic diastolic
Received call from Greg in PT, recommends IP rehab for weakness and endurance, stairs at home
[facility]  Office: [address], DO, Johanaflor Vigil, DO, Maraugust Hutchinson, DO, Laurence Magallanes Blood, DO, Star Patel MD, Unique Ramirez MD, Vasquez Pérez MD, Suze Summers MD,  Daisy Harris MD, Yisel Berry MD, Gagan Pathak, DO, Johnny Bolaños MD,  Rafael Pina MD, Skylar Vizcaino MD, Juanjose Baer, DO, Sonny Gould MD, Cesia Givens MD, Christina Roach DO, Chip Brownlee MD, Yoel Rand MD, Cornelio Charles MD, Tony Portillo MD,  Cong Tapia DO, Bishop Rossi MD,  Roddy Chatterjee, CNP,  Carlota Nance, CNP, Livier Young, CNP, Fernando Feliz, CNP,  Lyn Gutierres, [facility], Samantha Bassett, CNP, Clarisse Thomson, CNP, Summer Collado, CNP, Inocencia Johnson, CNP, Tran Chinchilla, CNP, Paul Mccormick PA-C, Crystal Oliver, CNS, Ehsan Pelham, CNP, Refugio Garayll, [facility]    Progress Note    5/8/2023    8:56 AM    Name:   Angela Carlos  MRN:     [id number]     Acct:      [de-identified]   Room:   2033/2033-01  IP Day:  6  Admit Date:  5/2/2023  6:43 AM    PCP:   Harvey García MD  Code Status:  Full Code    Subjective:     C/C:   Chief Complaint   Patient presents with    Shortness of Breath     Initially in the 70's on RA. Given albuterol PTA. Edema     Interval History Status: Improved. Vitals reviewed, afebrile and hemodynamically stable. Saturating well on room air. Labs reviewed, creatinine normalized. Mild hypokalemia replace per protocol. Overnight had no significant events. On examination patient resting comfortably in bed. No complaints. PT/OT today. Final nephro recs for diuretics. Placement vs Home. Brief History: This is a 70-year-old male with a history of right-sided heart failure from pulmonary hypertension, morbid obesity, obstructive sleep apnea presents to the hospital with evidence of severe volume overload. Patient has anasarca diffusely with scrotal edema.
before progressing to ambulation this date. Transfers  Surface: To chair with arms;From chair with arms  Additional Factors: Set-up; Verbal cues; Hand placement cues; Increased time to complete  Device: Walker  Sit to Stand  Assistance Level: Moderate assistance; Requires x 2 assistance  Skilled Clinical Factors: intial sit to standing requires MOD x2 for stability and support however as time progresses patient becomes a MIN/MOD x1 patient with increased tolerance to activities as compared to previous treatment. Stand to Sit  Assistance Level: Minimal assistance; Requires x 2 assistance  Skilled Clinical Factors: vc's for hand placement and self pacing to maximize control in descent and promote eccentric quad control      Environmental Mobility  Ambulation  Surface: Level surface  Device: Rolling walker (Bariatric)  Distance: 20 feet x2  Activity: Within Room  Activity Comments: slow, abducted gait at hips due to scrutum edema  Additional Factors: Set-up; Verbal cues; Increased time to complete  Assistance Level: Minimal assistance; Moderate assistance; Requires x 2 assistance  Gait Deviations: Slow nissa;Decreased heel strike right;Decreased heel strike left;Path deviations  Skilled Clinical Factors: Patient initially a MIN/MOD x2 in ambulation progressing to a MIN x1 in ambulation. Patient with improved stability and support as compared to prior treatment. Neuromuscular Education  Neuromuscular Education: Yes Gait ;Sitting;Standing; Lower extremity    PT Exercises  Exercise Treatment: Seated SABINO LE education this date due to edema patient minimally performs SABINO LE seated exercises.   Breathing Techniques: pursed lip breathing technique to maximize endurance and tolerance to activity      ASSESSMENT/PROGRESS TOWARDS GOALS     AM-PAC Inpatient Mobility Raw Score  16   AM-PAC Inpatient T-Scale Score  40.78   Mobility Inpatient CMS 0-100% Score 54.16   Mobility Inpatient CMS G-Code Modifier  CK
MPO ANCA:   Lab Results   Component Value Date/Time    MPO <0.3 05/02/2023 07:13 PM    .  PR3 ANCA:    Lab Results   Component Value Date/Time    PR3 <0.7 05/02/2023 07:13 PM     URINE SODIUM:    Lab Results   Component Value Date/Time    JOSELYN 20 05/03/2023 12:30 AM      URINE CREATININE:    Lab Results   Component Value Date/Time    LABCREA 115.7 05/03/2023 12:30 AM     URINE EOSINOPHILS:   Lab Results   Component Value Date/Time    UREO NONE SEEN 05/03/2023 12:30 AM     URINE PROTEIN:    Lab Results   Component Value Date/Time    TPU 43 05/03/2023 12:30 AM     URINALYSIS:  U/A:   Lab Results   Component Value Date/Time    NITRU NEGATIVE 05/03/2023 12:30 AM    COLORU Yellow 05/03/2023 12:30 AM    PHUR 5.5 05/03/2023 12:30 AM    WBCUA 5 TO 10 05/03/2023 12:30 AM    RBCUA TOO NUMEROUS TO COUNT 05/03/2023 12:30 AM    YEAST FEW 05/02/2023 08:30 AM    BACTERIA FEW 05/03/2023 12:30 AM    CLARITYU clear 09/12/2014 12:00 AM    SPECGRAV 1.020 05/03/2023 12:30 AM    LEUKOCYTESUR SMALL 05/03/2023 12:30 AM    UROBILINOGEN Normal 05/03/2023 12:30 AM    BILIRUBINUR NEGATIVE 05/03/2023 12:30 AM    BILIRUBINUR neg 09/12/2014 12:00 AM    BLOODU neg 09/12/2014 12:00 AM    GLUCOSEU NEGATIVE 05/03/2023 12:30 AM    KETUA NEGATIVE 05/03/2023 12:30 AM     Chest x-ray from 5 7 showed improving however still with small effusion    ASSESSMENT      1. VALENTINE secondary to hypoperfusion from CHF decompensation and creatinine peaked at 1.9. Creatinine is down to 1.1 today  2. HTN: Stable blood pressure  3. Right greater than left heart failure  4. Obstructive uropathy from lower urinary tract obstruction. Urology follow  5. Sleep apnea   6 morbid dizziness     PLAN      1. Start him on oral Demadex, 10 mEq of KCl, continue Aldactone   2. From my standpoint he can be discharged and have his right heart cath as an outpatient. 3. Follow up labs ordered. 4. Following along       Please do not hesitate to call with questions.     This note

## 2023-05-08 NOTE — DISCHARGE INSTRUCTIONS
Follow-up with your PCP in 3 days. Call for an appointment as was possible. Follow-up with nephrology as instructed. Call for an appointment as was possible. Follow-up with cardiology as instructed. Call for an appointment as possible  Medications as instructed. Return to the emergency room immediately for any new or worsening concerns.

## 2023-05-08 NOTE — PLAN OF CARE
Problem: Respiratory - Adult  Goal: Adequate oxygenation  Description: Adequate oxygenation  5/8/2023 0800 by Christopher Mckeon RCP  Outcome: Progressing  5/7/2023 1949 by Jovita Pierce RCP  Outcome: Progressing  Goal: Able to breathe comfortably  5/8/2023 0800 by Christopher Mckeon RCP  Outcome: Progressing  5/7/2023 1949 by Jovita Pierce RCP  Outcome: Progressing  Goal: Patient's breath sounds will be clear and equal  5/8/2023 0800 by Christopher Mckeon RCP  Outcome: Progressing  5/7/2023 1949 by Jovita Pierce RCP  Outcome: Progressing

## 2023-05-08 NOTE — PLAN OF CARE
Problem: Chronic Conditions and Co-morbidities  Goal: Patient's chronic conditions and co-morbidity symptoms are monitored and maintained or improved  Outcome: Adequate for Discharge     Problem: Discharge Planning  Goal: Discharge to home or other facility with appropriate resources  Outcome: Adequate for Discharge     Problem: Pain  Goal: Verbalizes/displays adequate comfort level or baseline comfort level  Outcome: Adequate for Discharge     Problem: Skin/Tissue Integrity  Goal: Absence of new skin breakdown  Description: 1. Monitor for areas of redness and/or skin breakdown  2. Assess vascular access sites hourly  3. Every 4-6 hours minimum:  Change oxygen saturation probe site  4. Every 4-6 hours:  If on nasal continuous positive airway pressure, respiratory therapy assess nares and determine need for appliance change or resting period.   Outcome: Adequate for Discharge     Problem: Safety - Adult  Goal: Free from fall injury  Outcome: Adequate for Discharge

## 2023-05-08 NOTE — DISCHARGE INSTR - COC
immediately for any new or worsening concerns. Physician Certification: I certify the above information and transfer of Angela Carlos  is necessary for the continuing treatment of the diagnosis listed and that he requires [address] for greater 30 days.      Update Admission H&P: No change in H&P    PHYSICIAN SIGNATURE:  Electronically signed by Christina Roach DO on 5/8/23 at 2:23 PM EDT

## 2023-05-09 LAB
MICROORGANISM SPEC CULT: NORMAL
MICROORGANISM SPEC CULT: NORMAL
SERVICE CMNT-IMP: NORMAL
SERVICE CMNT-IMP: NORMAL
SPECIMEN DESCRIPTION: NORMAL
SPECIMEN DESCRIPTION: NORMAL

## 2023-05-09 NOTE — ADT AUTH CERT
of care    5/9/2023 3:44 PM EDT by Matias Gonzalez: 66 (H)  Creatinine: 1.86 (H)  Bun/Cre Ratio: 35 (H)  Est Glojanessa Filt Rate: 43 (L)    ( ) * Electrolyte abnormalities absent or acceptable for next level of care    5/9/2023 3:44 PM EDT by Doyle Healy      Phosphorus: 5.9 (H)    ( ) * Precipitating factors absent or controlled    5/9/2023 3:44 PM EDT by Doyle Healy      still pending cardio recommendation    ( ) * Discharge plans and education understood    Activity    ( ) * Ambulatory or acceptable for next level of care    5/9/2023 3:44 PM EDT by Doyle Healy      Pt not appropriate/able to tolerate OOB activity at this time including being on Bipap/precedex drip -per RN    Routes    (X) * Oral hydration    5/9/2023 3:44 PM EDT by Doyle Healy      po hydration with 1500 ml restriction    ( ) * Oral medications or regimen acceptable for next level of care    5/9/2023 3:44 PM EDT by Doyle Healy      with iv and po meds    ( ) * Oral diet or acceptable for next level of care    5/9/2023 3:44 PM EDT by Doyle Healy      Regular; 4 carb choices (60 gm/meal);  Low Sodium (2 gm); 1500 ml    Interventions    (X) Weigh    5/9/2023 3:44 PM EDT by Doyle Healy      382 lb 8 oz    Medications    (X) Diuretics    5/9/2023 3:44 PM EDT by Doyle Healy      lasix 80 mg qid iv x3  metolazone 2.5 mg daily po    (X) Possible aldosterone antagonist    5/9/2023 3:44 PM EDT by Doyle Healy      aldactone 12.5 mg daily po       Definitions for Care Day 3    Hemodynamic stability    (X) Hemodynamic stability, as indicated by  1 or more  of the following :       (X) Hemodynamic abnormalities at baseline or acceptable for next level of care       (X) Patient hemodynamically stable, as indicated by  ALL  of the following  (1) (2) (3) (4) (5):          (X) Tachycardia absent          (X) Hypotension absent          (X) No evidence of inadequate perfusion (eg, no myocardial ischemia)          (X) No other

## 2023-07-21 RX ORDER — SODIUM CHLORIDE 9 MG/ML
5-250 INJECTION, SOLUTION INTRAVENOUS PRN
OUTPATIENT
Start: 2023-07-21

## 2023-07-31 ENCOUNTER — INITIAL CONSULT (OUTPATIENT)
Dept: ONCOLOGY | Age: 53
End: 2023-07-31
Payer: COMMERCIAL

## 2023-07-31 ENCOUNTER — TELEPHONE (OUTPATIENT)
Dept: ONCOLOGY | Age: 53
End: 2023-07-31

## 2023-07-31 VITALS
WEIGHT: 315 LBS | SYSTOLIC BLOOD PRESSURE: 145 MMHG | DIASTOLIC BLOOD PRESSURE: 82 MMHG | HEIGHT: 72 IN | BODY MASS INDEX: 42.66 KG/M2 | HEART RATE: 80 BPM | TEMPERATURE: 98.7 F | RESPIRATION RATE: 16 BRPM

## 2023-07-31 DIAGNOSIS — E66.01 MORBID OBESITY (HCC): ICD-10-CM

## 2023-07-31 DIAGNOSIS — D50.9 IRON DEFICIENCY ANEMIA, UNSPECIFIED IRON DEFICIENCY ANEMIA TYPE: Primary | ICD-10-CM

## 2023-07-31 PROCEDURE — 99202 OFFICE O/P NEW SF 15 MIN: CPT | Performed by: INTERNAL MEDICINE

## 2023-07-31 RX ORDER — GABAPENTIN 100 MG/1
200 CAPSULE ORAL DAILY
COMMUNITY

## 2023-07-31 RX ORDER — LEVOTHYROXINE SODIUM 0.05 MG/1
50 TABLET ORAL DAILY
COMMUNITY

## 2023-07-31 NOTE — PROGRESS NOTES
Pritesh Gordon                                                                                                                  7/31/2023  MRN:   [id number]  YOB: 1970  PCP:                           Shaun Zuluaga MD  Referring Physician: No ref. provider found  Treating Physician Name: Ami Lancaster MD      Reason for consultation:  Chief Complaint   Patient presents with    Consultation     anemia   Patient presents to the clinic to establish care and for further work-up and evaluation of anemia. Current problems:  Anemia  Iron deficiency  Pulmonary hypertension with right-sided heart failure  Morbid obesity  Obstructive sleep apnea  CKD  History of alcohol abuse    Active and recent treatments:  IV iron  Work-up in progress to rule out concomitant etiology contributing to anemia    Summary of Case/History:    Pritesh Gordon a 46 y.o.male is a patient with anemia presents to the clinic to establish care and for further work-up and evaluation. Patient has multiple comorbidities as mentioned above. Patient was recently admitted to the hospital back in May 2023. At that time patient was diagnosed with acute respiratory failure and acute kidney injury on CKD. Patient does have a history of alcohol abuse. Patient last hemoglobin noted was 7.9 platelet count 51,536 and WBC count of 6.5. Patient ANC was 4.7. Patient iron studies showed iron saturation of 10%. Patient's immunofixation studies were negative. Patient was also recently seen in the ER at [facility] for testicular pain ultrasound of scrotum showed bilateral varicoceles patient repeat B12 level came back at 296 CBC showed improvement of hemoglobin to 9.8 and improvement of platelet count of 176. However iron saturation was 7 to 8% patient inflammatory markers were positive.   Patient has not had a screening colonoscopy in the recent past.    Past Medical History:   Past Medical History:   Diagnosis Date    Alcohol abuse

## 2023-07-31 NOTE — TELEPHONE ENCOUNTER
[facility] ARRIVES AMBULATORY FOR CONSULTATION APPOINTMENT  DR BALDERAS IN TO SEE PATIENT  ORDERS RECEIVED  LABS TODAY  RV AFTER  LABS FERRITIN,FOLATE, FE, TIBC, LD, MONOCLONAL PANEL, PATH REVIEW SMEAR, RETIC, & BLOOD OCCULT STOOL  PT IS UNSURE OF WHEN LABS WILL BE DONE. HOPEFULLY THIS WEEK. ORDERS GIVEN TO PT. STOOL SAMPLE SUPPLIES GIVEN TO PT ALSO.   MD VISIT 08/21/23 @2:30PM  AVS PRINTED AND GIVEN TO PATIENT WITH INSTRUCTIONS  PATIENT DISCHARGED AMBULATORY

## 2023-08-02 ENCOUNTER — TELEPHONE (OUTPATIENT)
Dept: INFUSION THERAPY | Facility: MEDICAL CENTER | Age: 53
End: 2023-08-02

## 2023-08-02 ENCOUNTER — HOSPITAL ENCOUNTER (OUTPATIENT)
Dept: INFUSION THERAPY | Facility: MEDICAL CENTER | Age: 53
Discharge: HOME | End: 2023-08-02
Payer: COMMERCIAL

## 2023-08-02 VITALS
DIASTOLIC BLOOD PRESSURE: 75 MMHG | SYSTOLIC BLOOD PRESSURE: 116 MMHG | TEMPERATURE: 98.2 F | RESPIRATION RATE: 16 BRPM | HEART RATE: 72 BPM

## 2023-08-02 DIAGNOSIS — D63.1 ANEMIA DUE TO STAGE 3 CHRONIC KIDNEY DISEASE, UNSPECIFIED WHETHER STAGE 3A OR 3B CKD (HCC): Primary | ICD-10-CM

## 2023-08-02 DIAGNOSIS — N18.30 ANEMIA DUE TO STAGE 3 CHRONIC KIDNEY DISEASE, UNSPECIFIED WHETHER STAGE 3A OR 3B CKD (HCC): Primary | ICD-10-CM

## 2023-08-02 PROCEDURE — 96366 THER/PROPH/DIAG IV INF ADDON: CPT

## 2023-08-02 PROCEDURE — 6360000002 HC RX W HCPCS: Performed by: INTERNAL MEDICINE

## 2023-08-02 PROCEDURE — 96365 THER/PROPH/DIAG IV INF INIT: CPT

## 2023-08-02 PROCEDURE — 2580000003 HC RX 258: Performed by: INTERNAL MEDICINE

## 2023-08-02 RX ORDER — SODIUM CHLORIDE 9 MG/ML
5-250 INJECTION, SOLUTION INTRAVENOUS PRN
Status: CANCELLED | OUTPATIENT
Start: 2023-08-09

## 2023-08-02 RX ORDER — SODIUM CHLORIDE 9 MG/ML
5-250 INJECTION, SOLUTION INTRAVENOUS PRN
Status: DISCONTINUED | OUTPATIENT
Start: 2023-08-02 | End: 2023-08-03 | Stop reason: HOSPADM

## 2023-08-02 RX ADMIN — SODIUM CHLORIDE 250 ML/HR: 9 INJECTION, SOLUTION INTRAVENOUS at 09:15

## 2023-08-02 RX ADMIN — IRON SUCROSE 175 MG: 20 INJECTION, SOLUTION INTRAVENOUS at 10:53

## 2023-08-02 RX ADMIN — IRON SUCROSE 25 MG: 20 INJECTION, SOLUTION INTRAVENOUS at 09:39

## 2023-08-02 NOTE — PROGRESS NOTES
Patient arrived ambulatory with mother for 1/5 Venofer infusions. Patient denies complaints or concerns. IV inserted per unit protocol. Printed and verbal information provided about Venofer. Test dose Venofer infused with no sign adverse reaction. Patient observed for 10 minutes with no sign adverse reaction. Full dose Venofer infused with no sign adverse reaction;line flushed. IV removed with pressure dressing applied. Patient ambulated off unit per self at discharge. Printed calendar in hand.

## 2023-08-02 NOTE — TELEPHONE ENCOUNTER
New Venofer order 7/20/23. Venofer test dose 25mg diluted in 100ml NS, given over 1hr. Wait 10 minutes, if no reaction, continue with remainder of dose. Dilute 200mg venofer in 100ml NS, give over 1hr.  Give therapeutic dose weekly for 5 weeks. Total of 1025mg over 5 week period. Order noted and to  for processing.

## 2023-08-04 ENCOUNTER — CLINICAL DOCUMENTATION (OUTPATIENT)
Facility: HOSPITAL | Age: 53
End: 2023-08-04

## 2023-08-04 NOTE — PROGRESS NOTES
Patient Assistance    Met with: ***    Navigator Type:  Infusion  Documentation Type: New Patient  Contact Type: No Contact  Navigation Status: New Patient          Additional notes: *** no

## 2023-08-11 ENCOUNTER — HOSPITAL ENCOUNTER (OUTPATIENT)
Dept: INFUSION THERAPY | Facility: MEDICAL CENTER | Age: 53
Discharge: HOME | End: 2023-08-11
Payer: MEDICARE

## 2023-08-11 VITALS
DIASTOLIC BLOOD PRESSURE: 76 MMHG | SYSTOLIC BLOOD PRESSURE: 120 MMHG | RESPIRATION RATE: 16 BRPM | HEART RATE: 74 BPM | TEMPERATURE: 98.5 F

## 2023-08-11 DIAGNOSIS — N18.30 ANEMIA DUE TO STAGE 3 CHRONIC KIDNEY DISEASE, UNSPECIFIED WHETHER STAGE 3A OR 3B CKD (HCC): Primary | ICD-10-CM

## 2023-08-11 DIAGNOSIS — D63.1 ANEMIA DUE TO STAGE 3 CHRONIC KIDNEY DISEASE, UNSPECIFIED WHETHER STAGE 3A OR 3B CKD (HCC): Primary | ICD-10-CM

## 2023-08-11 PROCEDURE — 6360000002 HC RX W HCPCS: Performed by: INTERNAL MEDICINE

## 2023-08-11 PROCEDURE — 2580000003 HC RX 258: Performed by: INTERNAL MEDICINE

## 2023-08-11 PROCEDURE — 96365 THER/PROPH/DIAG IV INF INIT: CPT

## 2023-08-11 RX ORDER — SODIUM CHLORIDE 9 MG/ML
5-250 INJECTION, SOLUTION INTRAVENOUS PRN
Status: CANCELLED | OUTPATIENT
Start: 2023-08-16

## 2023-08-11 RX ORDER — SODIUM CHLORIDE 9 MG/ML
5-250 INJECTION, SOLUTION INTRAVENOUS PRN
Status: DISCONTINUED | OUTPATIENT
Start: 2023-08-11 | End: 2023-08-12 | Stop reason: HOSPADM

## 2023-08-11 RX ADMIN — SODIUM CHLORIDE 250 ML/HR: 9 INJECTION, SOLUTION INTRAVENOUS at 09:05

## 2023-08-11 RX ADMIN — IRON SUCROSE 200 MG: 20 INJECTION, SOLUTION INTRAVENOUS at 09:35

## 2023-08-11 NOTE — PROGRESS NOTES
Patient arrived ambulatory per self for 2/5 Venofer infusions. Patient denies complaints or concerns. IV inserted per unit protocol. Venofer infused with no sign adverse reaction;line flushed. IV removed with pressure dressing applied. Patient ambulated off unit per self at discharge.

## 2023-08-18 ENCOUNTER — HOSPITAL ENCOUNTER (OUTPATIENT)
Dept: INFUSION THERAPY | Facility: MEDICAL CENTER | Age: 53
Discharge: HOME | End: 2023-08-18
Payer: MEDICARE

## 2023-08-18 VITALS
TEMPERATURE: 98.3 F | RESPIRATION RATE: 18 BRPM | SYSTOLIC BLOOD PRESSURE: 128 MMHG | DIASTOLIC BLOOD PRESSURE: 71 MMHG | HEART RATE: 64 BPM

## 2023-08-18 DIAGNOSIS — N18.30 ANEMIA DUE TO STAGE 3 CHRONIC KIDNEY DISEASE, UNSPECIFIED WHETHER STAGE 3A OR 3B CKD (HCC): ICD-10-CM

## 2023-08-18 DIAGNOSIS — D50.9 IRON DEFICIENCY ANEMIA, UNSPECIFIED IRON DEFICIENCY ANEMIA TYPE: Primary | ICD-10-CM

## 2023-08-18 DIAGNOSIS — D63.1 ANEMIA DUE TO STAGE 3 CHRONIC KIDNEY DISEASE, UNSPECIFIED WHETHER STAGE 3A OR 3B CKD (HCC): ICD-10-CM

## 2023-08-18 PROCEDURE — 96365 THER/PROPH/DIAG IV INF INIT: CPT

## 2023-08-18 PROCEDURE — 2580000003 HC RX 258: Performed by: INTERNAL MEDICINE

## 2023-08-18 PROCEDURE — 6360000002 HC RX W HCPCS: Performed by: INTERNAL MEDICINE

## 2023-08-18 RX ORDER — MAGNESIUM 200 MG
1000 TABLET ORAL DAILY
COMMUNITY

## 2023-08-18 RX ORDER — SODIUM CHLORIDE 9 MG/ML
5-250 INJECTION, SOLUTION INTRAVENOUS PRN
Status: DISCONTINUED | OUTPATIENT
Start: 2023-08-18 | End: 2023-08-19 | Stop reason: HOSPADM

## 2023-08-18 RX ORDER — SODIUM CHLORIDE 9 MG/ML
5-250 INJECTION, SOLUTION INTRAVENOUS PRN
OUTPATIENT
Start: 2023-08-25

## 2023-08-18 RX ADMIN — SODIUM CHLORIDE 50 ML/HR: 9 INJECTION, SOLUTION INTRAVENOUS at 09:34

## 2023-08-18 RX ADMIN — IRON SUCROSE 200 MG: 20 INJECTION, SOLUTION INTRAVENOUS at 09:35

## 2023-08-18 NOTE — PROGRESS NOTES
Patient arrive ambulatory per self for 3 of 5 venofer infusions. Denies complaint or concern. Vitals as charted. Peripheral IV established per policy. Venofer infused with no sign of adverse reaction; line flushed. Intact IV catheter removed with pressure dressing applied. Patient off unit per self at discharge. Printed labs for patient as he needs to get labs completed prior to seeing Dr. Seema Joseph next week. He may or may not go to [facility].

## 2023-08-25 ENCOUNTER — HOSPITAL ENCOUNTER (OUTPATIENT)
Dept: INFUSION THERAPY | Facility: MEDICAL CENTER | Age: 53
End: 2023-08-25

## 2023-08-28 ENCOUNTER — OFFICE VISIT (OUTPATIENT)
Dept: ONCOLOGY | Age: 53
End: 2023-08-28
Payer: MEDICARE

## 2023-08-28 ENCOUNTER — TELEPHONE (OUTPATIENT)
Dept: ONCOLOGY | Age: 53
End: 2023-08-28

## 2023-08-28 VITALS
BODY MASS INDEX: 44.08 KG/M2 | HEART RATE: 81 BPM | RESPIRATION RATE: 18 BRPM | TEMPERATURE: 98.8 F | WEIGHT: 315 LBS | DIASTOLIC BLOOD PRESSURE: 66 MMHG | SYSTOLIC BLOOD PRESSURE: 119 MMHG

## 2023-08-28 DIAGNOSIS — K76.6 PORTAL VENOUS HYPERTENSION (HCC): ICD-10-CM

## 2023-08-28 DIAGNOSIS — D50.9 IRON DEFICIENCY ANEMIA, UNSPECIFIED IRON DEFICIENCY ANEMIA TYPE: Primary | ICD-10-CM

## 2023-08-28 PROCEDURE — 3074F SYST BP LT 130 MM HG: CPT | Performed by: INTERNAL MEDICINE

## 2023-08-28 PROCEDURE — 3017F COLORECTAL CA SCREEN DOC REV: CPT | Performed by: INTERNAL MEDICINE

## 2023-08-28 PROCEDURE — 4004F PT TOBACCO SCREEN RCVD TLK: CPT | Performed by: INTERNAL MEDICINE

## 2023-08-28 PROCEDURE — 99211 OFF/OP EST MAY X REQ PHY/QHP: CPT

## 2023-08-28 PROCEDURE — G8417 CALC BMI ABV UP PARAM F/U: HCPCS | Performed by: INTERNAL MEDICINE

## 2023-08-28 PROCEDURE — 3078F DIAST BP <80 MM HG: CPT | Performed by: INTERNAL MEDICINE

## 2023-08-28 PROCEDURE — 99214 OFFICE O/P EST MOD 30 MIN: CPT | Performed by: INTERNAL MEDICINE

## 2023-08-28 PROCEDURE — G8427 DOCREV CUR MEDS BY ELIG CLIN: HCPCS | Performed by: INTERNAL MEDICINE

## 2023-08-28 RX ORDER — ZONISAMIDE 25 MG/1
25 CAPSULE ORAL DAILY
COMMUNITY

## 2023-08-28 RX ORDER — FLUTICASONE FUROATE AND VILANTEROL 100; 25 UG/1; UG/1
1 POWDER RESPIRATORY (INHALATION) DAILY
COMMUNITY

## 2023-08-28 NOTE — PROGRESS NOTES
encounter of 05/02/23   COVID-19, Rapid    Specimen: Nasopharyngeal Swab   Result Value Ref Range    Specimen Description . NASOPHARYNGEAL SWAB     SARS-CoV-2, Rapid Not Detected Not Detected   Respiratory Panel, Molecular, with COVID-19 (Restricted: peds pts or suitable admitted adults)    Specimen: Nasopharyngeal Swab   Result Value Ref Range    Specimen Description . NASOPHARYNGEAL SWAB     Adenovirus PCR Not Detected Not Detected    Coronavirus 229E PCR Not Detected Not Detected    Coronavirus HKU1 PCR Not Detected Not Detected    Coronavirus NL63 PCR Not Detected Not Detected    Coronavirus OC43 PCR Not Detected Not Detected    SARS-CoV-2, PCR Not Detected Not Detected    Human Metapneumovirus PCR Not Detected Not Detected    Rhino/Enterovirus PCR Not Detected Not Detected    Influenza A by PCR Not Detected Not Detected    Influenza B by PCR Not Detected Not Detected    Parainfluenza 1 PCR Not Detected Not Detected    Parainfluenza 2 PCR Not Detected Not Detected    Parainfluenza 3 PCR Not Detected Not Detected    Parainfluenza 4 PCR Not Detected Not Detected    Resp Syncytial Virus PCR Not Detected Not Detected    Bordetella parapertussis by PCR Not Detected Not Detected    B Pertussis by PCR Not Detected Not Detected    Chlamydia pneumoniae By PCR Not Detected Not Detected    Mycoplasma pneumo by PCR Not Detected Not Detected   LEGIONELLA ANTIGEN, URINE    Specimen: Urine   Result Value Ref Range    Legionella Pneumophilia Ag, Urine NEGATIVE NEGATIVE   STREP PNEUMONIAE ANTIGEN    Specimen: Urine, clean catch   Result Value Ref Range    Source . URINE     Strep pneumo Ag NEGATIVE    Culture, Blood 1    Specimen: Blood   Result Value Ref Range    Specimen Description . BLOOD     Special Requests RIGHT HAND, 12ML     Culture NO GROWTH 5 DAYS    Culture, Blood 1    Specimen: Blood   Result Value Ref Range    Specimen Description . BLOOD     Special Requests LEFT HAND, 12ML     Culture POSITIVE Blood Culture (A)

## 2023-08-28 NOTE — TELEPHONE ENCOUNTER
KVNG HERE FOR MD VISIT  RV IN 4 MTHS W/ LABS  LABS CDP FERRITIN FE TIBC VITAMIN B 12 & FOLATE ORDERS GIVEN TO PT ON EXIT TO BE DONE 12/4/23  MD VISIT 12/11/23 @ 3:15PM  AVS PRINTED W/ INSTRUCTIONS AND GIVEN TO PT ON EXIT

## 2023-09-01 ENCOUNTER — HOSPITAL ENCOUNTER (OUTPATIENT)
Dept: INFUSION THERAPY | Facility: MEDICAL CENTER | Age: 53
End: 2023-09-01

## 2023-09-08 ENCOUNTER — HOSPITAL ENCOUNTER (OUTPATIENT)
Dept: INFUSION THERAPY | Facility: MEDICAL CENTER | Age: 53
End: 2023-09-08

## 2023-09-15 ENCOUNTER — HOSPITAL ENCOUNTER (OUTPATIENT)
Dept: INFUSION THERAPY | Facility: MEDICAL CENTER | Age: 53
Discharge: HOME | End: 2023-09-15
Payer: MEDICARE

## 2023-09-15 VITALS
HEART RATE: 78 BPM | DIASTOLIC BLOOD PRESSURE: 84 MMHG | RESPIRATION RATE: 16 BRPM | SYSTOLIC BLOOD PRESSURE: 143 MMHG | TEMPERATURE: 98.3 F

## 2023-09-15 DIAGNOSIS — D63.1 ANEMIA DUE TO STAGE 3 CHRONIC KIDNEY DISEASE, UNSPECIFIED WHETHER STAGE 3A OR 3B CKD (HCC): Primary | ICD-10-CM

## 2023-09-15 DIAGNOSIS — N18.30 ANEMIA DUE TO STAGE 3 CHRONIC KIDNEY DISEASE, UNSPECIFIED WHETHER STAGE 3A OR 3B CKD (HCC): Primary | ICD-10-CM

## 2023-09-15 PROCEDURE — 6360000002 HC RX W HCPCS: Performed by: INTERNAL MEDICINE

## 2023-09-15 PROCEDURE — 2580000003 HC RX 258: Performed by: INTERNAL MEDICINE

## 2023-09-15 PROCEDURE — 96365 THER/PROPH/DIAG IV INF INIT: CPT

## 2023-09-15 RX ORDER — SODIUM CHLORIDE 9 MG/ML
5-250 INJECTION, SOLUTION INTRAVENOUS PRN
Status: CANCELLED | OUTPATIENT
Start: 2023-09-22

## 2023-09-15 RX ORDER — SODIUM CHLORIDE 9 MG/ML
5-250 INJECTION, SOLUTION INTRAVENOUS PRN
Status: DISCONTINUED | OUTPATIENT
Start: 2023-09-15 | End: 2023-09-16 | Stop reason: HOSPADM

## 2023-09-15 RX ADMIN — SODIUM CHLORIDE 100 ML/HR: 9 INJECTION, SOLUTION INTRAVENOUS at 14:00

## 2023-09-15 RX ADMIN — IRON SUCROSE 200 MG: 20 INJECTION, SOLUTION INTRAVENOUS at 14:08

## 2023-09-15 NOTE — PROGRESS NOTES
Patient arrived ambulatory per self for 4 of 5 venofer infusions. Denies complaint or concern. Vitals as charted. Peripheral IV established per policy. Venofer infused with no sign of adverse reaction; line flushed. Intact IV catheter removed with pressure dressing applied. Patient ambulated off unit per self at discharge. Printed calendar in hand.

## 2023-09-22 ENCOUNTER — HOSPITAL ENCOUNTER (OUTPATIENT)
Dept: INFUSION THERAPY | Facility: MEDICAL CENTER | Age: 53
Discharge: HOME | End: 2023-09-22
Payer: MEDICARE

## 2023-09-22 VITALS
DIASTOLIC BLOOD PRESSURE: 85 MMHG | SYSTOLIC BLOOD PRESSURE: 139 MMHG | TEMPERATURE: 98.9 F | HEART RATE: 69 BPM | RESPIRATION RATE: 16 BRPM

## 2023-09-22 DIAGNOSIS — N18.30 ANEMIA DUE TO STAGE 3 CHRONIC KIDNEY DISEASE, UNSPECIFIED WHETHER STAGE 3A OR 3B CKD (HCC): Primary | ICD-10-CM

## 2023-09-22 DIAGNOSIS — D63.1 ANEMIA DUE TO STAGE 3 CHRONIC KIDNEY DISEASE, UNSPECIFIED WHETHER STAGE 3A OR 3B CKD (HCC): Primary | ICD-10-CM

## 2023-09-22 PROCEDURE — 6360000002 HC RX W HCPCS: Performed by: INTERNAL MEDICINE

## 2023-09-22 PROCEDURE — 96365 THER/PROPH/DIAG IV INF INIT: CPT

## 2023-09-22 PROCEDURE — 2580000003 HC RX 258: Performed by: INTERNAL MEDICINE

## 2023-09-22 RX ORDER — SODIUM CHLORIDE 9 MG/ML
5-250 INJECTION, SOLUTION INTRAVENOUS PRN
Status: DISCONTINUED | OUTPATIENT
Start: 2023-09-22 | End: 2023-09-23 | Stop reason: HOSPADM

## 2023-09-22 RX ORDER — SODIUM CHLORIDE 9 MG/ML
5-250 INJECTION, SOLUTION INTRAVENOUS PRN
OUTPATIENT
Start: 2023-09-22

## 2023-09-22 RX ADMIN — SODIUM CHLORIDE 100 ML/HR: 9 INJECTION, SOLUTION INTRAVENOUS at 08:09

## 2023-09-22 RX ADMIN — IRON SUCROSE 200 MG: 20 INJECTION, SOLUTION INTRAVENOUS at 08:12

## 2023-09-22 NOTE — PROGRESS NOTES
Pt here for 5 of 5 venofer infusions   Arrives ambulatory   Denies complaint/concern   Tx complete without incident   Pt discharged in stable condition

## 2023-11-20 ENCOUNTER — TELEPHONE (OUTPATIENT)
Dept: INFUSION THERAPY | Facility: MEDICAL CENTER | Age: 53
End: 2023-11-20

## 2023-11-20 NOTE — TELEPHONE ENCOUNTER
Pt called on Friday, regarding labs from PCP, Sara Beckman called pt back and states was able to speak with PCP office and no further questions.

## 2024-01-04 ENCOUNTER — TELEMEDICINE (OUTPATIENT)
Dept: FAMILY MEDICINE CLINIC | Age: 54
End: 2024-01-04
Payer: COMMERCIAL

## 2024-01-04 DIAGNOSIS — I50.9 ACUTE ON CHRONIC CONGESTIVE HEART FAILURE, UNSPECIFIED HEART FAILURE TYPE (HCC): ICD-10-CM

## 2024-01-04 DIAGNOSIS — E11.42 POLYNEUROPATHY DUE TO TYPE 2 DIABETES MELLITUS (HCC): ICD-10-CM

## 2024-01-04 DIAGNOSIS — I10 ESSENTIAL HYPERTENSION, BENIGN: ICD-10-CM

## 2024-01-04 DIAGNOSIS — Z76.89 ESTABLISHING CARE WITH NEW DOCTOR, ENCOUNTER FOR: Primary | ICD-10-CM

## 2024-01-04 DIAGNOSIS — E11.65 TYPE 2 DIABETES MELLITUS WITH HYPERGLYCEMIA, WITHOUT LONG-TERM CURRENT USE OF INSULIN (HCC): ICD-10-CM

## 2024-01-04 DIAGNOSIS — I10 PRIMARY HYPERTENSION: ICD-10-CM

## 2024-01-04 PROCEDURE — 99205 OFFICE O/P NEW HI 60 MIN: CPT | Performed by: FAMILY MEDICINE

## 2024-01-04 RX ORDER — DULAGLUTIDE 0.75 MG/.5ML
0.75 INJECTION, SOLUTION SUBCUTANEOUS WEEKLY
Qty: 4 ADJUSTABLE DOSE PRE-FILLED PEN SYRINGE | Refills: 0 | Status: SHIPPED | OUTPATIENT
Start: 2024-01-04

## 2024-01-04 SDOH — ECONOMIC STABILITY: INCOME INSECURITY: HOW HARD IS IT FOR YOU TO PAY FOR THE VERY BASICS LIKE FOOD, HOUSING, MEDICAL CARE, AND HEATING?: NOT HARD AT ALL

## 2024-01-04 SDOH — ECONOMIC STABILITY: FOOD INSECURITY: WITHIN THE PAST 12 MONTHS, YOU WORRIED THAT YOUR FOOD WOULD RUN OUT BEFORE YOU GOT MONEY TO BUY MORE.: NEVER TRUE

## 2024-01-04 SDOH — ECONOMIC STABILITY: HOUSING INSECURITY
IN THE LAST 12 MONTHS, WAS THERE A TIME WHEN YOU DID NOT HAVE A STEADY PLACE TO SLEEP OR SLEPT IN A SHELTER (INCLUDING NOW)?: NO

## 2024-01-04 SDOH — ECONOMIC STABILITY: FOOD INSECURITY: WITHIN THE PAST 12 MONTHS, THE FOOD YOU BOUGHT JUST DIDN'T LAST AND YOU DIDN'T HAVE MONEY TO GET MORE.: NEVER TRUE

## 2024-01-04 ASSESSMENT — PATIENT HEALTH QUESTIONNAIRE - PHQ9
SUM OF ALL RESPONSES TO PHQ QUESTIONS 1-9: 0
2. FEELING DOWN, DEPRESSED OR HOPELESS: 0
SUM OF ALL RESPONSES TO PHQ QUESTIONS 1-9: 0
SUM OF ALL RESPONSES TO PHQ9 QUESTIONS 1 & 2: 0
SUM OF ALL RESPONSES TO PHQ QUESTIONS 1-9: 0
1. LITTLE INTEREST OR PLEASURE IN DOING THINGS: 0
SUM OF ALL RESPONSES TO PHQ QUESTIONS 1-9: 0

## 2024-01-05 ENCOUNTER — HOSPITAL ENCOUNTER (OUTPATIENT)
Age: 54
Setting detail: SPECIMEN
Discharge: HOME | End: 2024-01-05

## 2024-01-05 DIAGNOSIS — E11.42 POLYNEUROPATHY DUE TO TYPE 2 DIABETES MELLITUS (HCC): ICD-10-CM

## 2024-01-05 DIAGNOSIS — D50.9 IRON DEFICIENCY ANEMIA, UNSPECIFIED IRON DEFICIENCY ANEMIA TYPE: ICD-10-CM

## 2024-01-05 DIAGNOSIS — I10 ESSENTIAL HYPERTENSION, BENIGN: ICD-10-CM

## 2024-01-05 DIAGNOSIS — I50.9 ACUTE ON CHRONIC CONGESTIVE HEART FAILURE, UNSPECIFIED HEART FAILURE TYPE (HCC): ICD-10-CM

## 2024-01-05 DIAGNOSIS — E11.65 TYPE 2 DIABETES MELLITUS WITH HYPERGLYCEMIA, WITHOUT LONG-TERM CURRENT USE OF INSULIN (HCC): ICD-10-CM

## 2024-01-05 DIAGNOSIS — K76.6 PORTAL VENOUS HYPERTENSION (HCC): ICD-10-CM

## 2024-01-05 DIAGNOSIS — I10 PRIMARY HYPERTENSION: ICD-10-CM

## 2024-01-05 LAB
ALBUMIN SERPL-MCNC: 4.2 G/DL (ref 3.5–5.2)
ALBUMIN/GLOB SERPL: 1.2 {RATIO} (ref 1–2.5)
ALP SERPL-CCNC: 193 U/L (ref 40–129)
ALT SERPL-CCNC: 14 U/L (ref 5–41)
ANION GAP SERPL CALCULATED.3IONS-SCNC: 12 MMOL/L (ref 9–17)
ANION GAP SERPL CALCULATED.3IONS-SCNC: 13 MMOL/L (ref 9–17)
AST SERPL-CCNC: 29 U/L
BASOPHILS # BLD: 0.03 K/UL (ref 0–0.2)
BASOPHILS # BLD: 0.03 K/UL (ref 0–0.2)
BASOPHILS NFR BLD: 0 % (ref 0–2)
BASOPHILS NFR BLD: 0 % (ref 0–2)
BILIRUB SERPL-MCNC: 1.3 MG/DL (ref 0.3–1.2)
BUN SERPL-MCNC: 17 MG/DL (ref 6–20)
BUN SERPL-MCNC: 18 MG/DL (ref 6–20)
CALCIUM SERPL-MCNC: 9 MG/DL (ref 8.6–10.4)
CALCIUM SERPL-MCNC: 9.1 MG/DL (ref 8.6–10.4)
CHLORIDE SERPL-SCNC: 94 MMOL/L (ref 98–107)
CHLORIDE SERPL-SCNC: 94 MMOL/L (ref 98–107)
CHOLEST SERPL-MCNC: 137 MG/DL
CHOLESTEROL/HDL RATIO: 2.2
CO2 SERPL-SCNC: 27 MMOL/L (ref 20–31)
CO2 SERPL-SCNC: 29 MMOL/L (ref 20–31)
CREAT SERPL-MCNC: 0.9 MG/DL (ref 0.7–1.2)
CREAT SERPL-MCNC: 0.9 MG/DL (ref 0.7–1.2)
EOSINOPHIL # BLD: 0.15 K/UL (ref 0–0.44)
EOSINOPHIL # BLD: 0.15 K/UL (ref 0–0.44)
EOSINOPHILS RELATIVE PERCENT: 2 % (ref 1–4)
EOSINOPHILS RELATIVE PERCENT: 2 % (ref 1–4)
ERYTHROCYTE [DISTWIDTH] IN BLOOD BY AUTOMATED COUNT: 12.9 % (ref 11.8–14.4)
ERYTHROCYTE [DISTWIDTH] IN BLOOD BY AUTOMATED COUNT: 12.9 % (ref 11.8–14.4)
FERRITIN SERPL-MCNC: 67 NG/ML (ref 30–400)
FOLATE SERPL-MCNC: >20 NG/ML (ref 4.8–24.2)
GFR SERPL CREATININE-BSD FRML MDRD: >60 ML/MIN/1.73M2
GFR SERPL CREATININE-BSD FRML MDRD: >60 ML/MIN/1.73M2
GLUCOSE SERPL-MCNC: 139 MG/DL (ref 70–99)
GLUCOSE SERPL-MCNC: 144 MG/DL (ref 70–99)
HCT VFR BLD AUTO: 33.4 % (ref 40.7–50.3)
HCT VFR BLD AUTO: 33.8 % (ref 40.7–50.3)
HDLC SERPL-MCNC: 63 MG/DL
HGB BLD-MCNC: 10.7 G/DL (ref 13–17)
HGB BLD-MCNC: 10.9 G/DL (ref 13–17)
IMM GRANULOCYTES # BLD AUTO: 0.03 K/UL (ref 0–0.3)
IMM GRANULOCYTES # BLD AUTO: 0.04 K/UL (ref 0–0.3)
IMM GRANULOCYTES NFR BLD: 0 %
IMM GRANULOCYTES NFR BLD: 1 %
IRON SATN MFR SERPL: 11 % (ref 20–55)
IRON SERPL-MCNC: 47 UG/DL (ref 59–158)
LDLC SERPL CALC-MCNC: 59 MG/DL (ref 0–130)
LYMPHOCYTES NFR BLD: 0.98 K/UL (ref 1.1–3.7)
LYMPHOCYTES NFR BLD: 1.01 K/UL (ref 1.1–3.7)
LYMPHOCYTES RELATIVE PERCENT: 14 % (ref 24–43)
LYMPHOCYTES RELATIVE PERCENT: 14 % (ref 24–43)
MCH RBC QN AUTO: 30.8 PG (ref 25.2–33.5)
MCH RBC QN AUTO: 31.1 PG (ref 25.2–33.5)
MCHC RBC AUTO-ENTMCNC: 32 G/DL (ref 28.4–34.8)
MCHC RBC AUTO-ENTMCNC: 32.2 G/DL (ref 28.4–34.8)
MCV RBC AUTO: 96.3 FL (ref 82.6–102.9)
MCV RBC AUTO: 96.6 FL (ref 82.6–102.9)
MONOCYTES NFR BLD: 0.54 K/UL (ref 0.1–1.2)
MONOCYTES NFR BLD: 0.57 K/UL (ref 0.1–1.2)
MONOCYTES NFR BLD: 8 % (ref 3–12)
MONOCYTES NFR BLD: 8 % (ref 3–12)
NEUTROPHILS NFR BLD: 75 % (ref 36–65)
NEUTROPHILS NFR BLD: 76 % (ref 36–65)
NEUTS SEG NFR BLD: 5.46 K/UL (ref 1.5–8.1)
NEUTS SEG NFR BLD: 5.48 K/UL (ref 1.5–8.1)
NRBC BLD-RTO: 0 PER 100 WBC
NRBC BLD-RTO: 0 PER 100 WBC
PLATELET # BLD AUTO: 104 K/UL (ref 138–453)
PLATELET # BLD AUTO: 107 K/UL (ref 138–453)
PMV BLD AUTO: 11.1 FL (ref 8.1–13.5)
PMV BLD AUTO: 11.2 FL (ref 8.1–13.5)
POTASSIUM SERPL-SCNC: 3.9 MMOL/L (ref 3.7–5.3)
POTASSIUM SERPL-SCNC: 4.1 MMOL/L (ref 3.7–5.3)
PROT SERPL-MCNC: 7.8 G/DL (ref 6.4–8.3)
PSA SERPL-MCNC: 0.5 NG/ML (ref 0–4)
RBC # BLD AUTO: 3.47 M/UL (ref 4.21–5.77)
RBC # BLD AUTO: 3.5 M/UL (ref 4.21–5.77)
SHBG SERPL-SCNC: 40 NMOL/L (ref 11–80)
SODIUM SERPL-SCNC: 134 MMOL/L (ref 135–144)
SODIUM SERPL-SCNC: 135 MMOL/L (ref 135–144)
T4 FREE SERPL-MCNC: 1.5 NG/DL (ref 0.9–1.7)
TESTOST FREE MFR SERPL: 36.1 PG/ML (ref 47–244)
TESTOST SERPL-MCNC: 213 NG/DL (ref 220–1000)
TIBC SERPL-MCNC: 416 UG/DL (ref 250–450)
TRIGL SERPL-MCNC: 75 MG/DL
TSH SERPL DL<=0.05 MIU/L-ACNC: 2.16 UIU/ML (ref 0.3–5)
UNSATURATED IRON BINDING CAPACITY: 369 UG/DL (ref 112–347)
VIT B12 SERPL-MCNC: 957 PG/ML (ref 232–1245)
WBC OTHER # BLD: 7.2 K/UL (ref 3.5–11.3)
WBC OTHER # BLD: 7.3 K/UL (ref 3.5–11.3)

## 2024-01-08 ENCOUNTER — TELEMEDICINE (OUTPATIENT)
Dept: ONCOLOGY | Age: 54
End: 2024-01-08
Payer: COMMERCIAL

## 2024-01-08 DIAGNOSIS — K76.6 PORTAL VENOUS HYPERTENSION (HCC): ICD-10-CM

## 2024-01-08 DIAGNOSIS — D50.9 IRON DEFICIENCY ANEMIA, UNSPECIFIED IRON DEFICIENCY ANEMIA TYPE: Primary | ICD-10-CM

## 2024-01-08 DIAGNOSIS — E66.01 MORBID OBESITY (HCC): ICD-10-CM

## 2024-01-08 PROCEDURE — 99214 OFFICE O/P EST MOD 30 MIN: CPT | Performed by: INTERNAL MEDICINE

## 2024-01-08 RX ORDER — FERROUS SULFATE 325(65) MG
325 TABLET ORAL
Qty: 90 TABLET | Refills: 1 | Status: SHIPPED | OUTPATIENT
Start: 2024-01-08

## 2024-01-08 RX ORDER — ASCORBIC ACID 250 MG
250 TABLET ORAL DAILY
Qty: 90 TABLET | Refills: 1 | Status: SHIPPED | OUTPATIENT
Start: 2024-01-08 | End: 2024-07-06

## 2024-01-08 NOTE — PROGRESS NOTES
Car Brothers, was evaluated through a synchronous (real-time) audio-video encounter. The patient (or guardian if applicable) is aware that this is a billable service, which includes applicable co-pays. This Virtual Visit was conducted with patient's (and/or legal guardian's) consent. Patient identification was verified, and a caregiver was present when appropriate.   The patient was located at Home: [address] [place] [id number]  [facility] [place]      Car Brothers (:  1970) is a New patient, presenting virtually for evaluation of the following:    Assessment & Plan   Below is the assessment and plan developed based on review of pertinent history, physical exam, labs, studies, and medications.  1. Establishing care with new doctor, encounter for  -     Dulaglutide (TRULICITY) 0.75 MG/0.5ML SOPN; Inject 0.75 mg into the skin once a week, Disp-4 Adjustable Dose Pre-filled Pen Syringe, R-0Normal  2. Essential hypertension, benign  -     Dulaglutide (TRULICITY) 0.75 MG/0.5ML SOPN; Inject 0.75 mg into the skin once a week, Disp-4 Adjustable Dose Pre-filled Pen Syringe, R-0Normal  -     CBC with Auto Differential; Future  -     Comprehensive Metabolic Panel; Future  -     Lipid Panel; Future  -     PSA, Prostatic Specific Antigen; Future  -     Testosterone, Free; Future  -     T4, Free; Future  -     TSH; Future  3. Primary hypertension  -     Dulaglutide (TRULICITY) 0.75 MG/0.5ML SOPN; Inject 0.75 mg into the skin once a week, Disp-4 Adjustable Dose Pre-filled Pen Syringe, R-0Normal  -     CBC with Auto Differential; Future  -     Comprehensive Metabolic Panel; Future  -     Lipid Panel; Future  -     PSA, Prostatic Specific Antigen; Future  -     Testosterone, Free; Future  -     T4, Free; Future  -     TSH; Future  4. Polyneuropathy due to type 2 diabetes mellitus (HCC)  -     Dulaglutide (TRULICITY) 0.75 MG/0.5ML SOPN; Inject 0.75 mg into the skin once a week,

## 2024-01-08 NOTE — PROGRESS NOTES
Car Brothers                                                                                                                  1/8/2024  MRN:   [id number]  YOB: 1970  PCP:                           Kimym Black DO  Referring Physician: No ref. provider found  Treating Physician Name: BLAZE BALDERAS MD      Reason for visit:  Review results of lab workup.  Patient seen in clinic via virtual visit.    Current problems:  Anemia  Iron deficiency  Pulmonary hypertension with right-sided heart failure  Morbid obesity  Obstructive sleep apnea  CKD  History of alcohol abuse    Active and recent treatments:  Refer-9/2023  Oral iron with vitamin C    Interval history:  Patient seen in clinic via virtual visit.  Patient underwent shoulder surgery at [facility] in November.  Patient hemoglobin before surgery was within normal range.  Now has dropped again likely related to blood loss with the surgery.  Patient received Venofer back in September.  Recent iron studies show iron deficiency.  Patient currently not on any oral iron.    During this visit patient's allergy, social, medical, surgical history and medications were reviewed and updated.    Summary of Case/History:    Car Brothers a 53 y.o.male is a patient with anemia presents to the clinic to establish care and for further work-up and evaluation.  Patient has multiple comorbidities as mentioned above.  Patient was recently admitted to the hospital back in May 2023.  At that time patient was diagnosed with acute respiratory failure and acute kidney injury on CKD.  Patient does have a history of alcohol abuse.  Patient last hemoglobin noted was 7.9 platelet count 97,000 and WBC count of 6.5.  Patient ANC was 4.7.  Patient iron studies showed iron saturation of 10%.  Patient's immunofixation studies were negative.  Patient was also recently seen in the ER at [facility] for testicular pain ultrasound of scrotum showed bilateral

## 2024-01-09 ENCOUNTER — TELEPHONE (OUTPATIENT)
Dept: ONCOLOGY | Age: 54
End: 2024-01-09

## 2024-01-09 NOTE — TELEPHONE ENCOUNTER
KVNG LOGGED IN VV  Return to clinic in 6 months with labs 1 week prior  LAB ORDERS MAILED TO PT TO BE DONE  7/1/24  MD VISIT 7/8/24 @ 3PM  AVS PRINTED W/ INSTRUCTIONS AND MAILED  TO PT

## 2024-02-01 ENCOUNTER — TELEMEDICINE (OUTPATIENT)
Dept: FAMILY MEDICINE CLINIC | Age: 54
End: 2024-02-01
Payer: COMMERCIAL

## 2024-02-01 DIAGNOSIS — G25.81 RESTLESS LEG SYNDROME: ICD-10-CM

## 2024-02-01 DIAGNOSIS — I10 PRIMARY HYPERTENSION: Primary | ICD-10-CM

## 2024-02-01 PROCEDURE — 99214 OFFICE O/P EST MOD 30 MIN: CPT | Performed by: FAMILY MEDICINE

## 2024-02-01 NOTE — PROGRESS NOTES
morbidly obese he is diabetic but his last A1c was controlled at 5.9 so unfortunately does not really qualify for GLP-1  I personally feel he is on way too many controlled medications    Objective   Patient-Reported Vitals  No data recorded     Physical Exam  [INSTRUCTIONS:  \"[x]\" Indicates a positive item  \"[]\" Indicates a negative item  -- DELETE ALL ITEMS NOT EXAMINED]    Constitutional: [x] Appears well-developed and well-nourished [x] No apparent distress      [] Abnormal -     Mental status: [x] Alert and awake  [x] Oriented to person/place/time [x] Able to follow commands    [] Abnormal -     Eyes:   EOM    [x]  Normal    [] Abnormal -   Sclera  [x]  Normal    [] Abnormal -          Discharge [x]  None visible   [] Abnormal -     HENT: [x] Normocephalic, atraumatic  [] Abnormal -   [x] Mouth/Throat: Mucous membranes are moist    External Ears [x] Normal  [] Abnormal -    Neck: [x] No visualized mass [] Abnormal -     Pulmonary/Chest: [x] Respiratory effort normal   [x] No visualized signs of difficulty breathing or respiratory distress        [] Abnormal -      Musculoskeletal:   [x] Normal gait with no signs of ataxia         [x] Normal range of motion of neck        [] Abnormal -     Neurological:        [x] No Facial Asymmetry (Cranial nerve 7 motor function) (limited exam due to video visit)          [x] No gaze palsy        [] Abnormal -          Skin:        [x] No significant exanthematous lesions or discoloration noted on facial skin         [] Abnormal -            Psychiatric:       [x] Normal Affect [] Abnormal -        [x] No Hallucinations    Other pertinent observable physical exam findings:-             --Kimmy Black, DO

## 2024-02-21 ENCOUNTER — TELEPHONE (OUTPATIENT)
Dept: FAMILY MEDICINE CLINIC | Age: 54
End: 2024-02-21

## 2024-02-21 DIAGNOSIS — G47.33 OBSTRUCTIVE SLEEP APNEA: Primary | ICD-10-CM

## 2024-02-21 NOTE — TELEPHONE ENCOUNTER
[facility]      Located in: [facility] Emergency Department  Address: [address], [place] [address]    Phone: [phone]

## 2024-02-21 NOTE — TELEPHONE ENCOUNTER
Just refer him to a pulmonologist he already has a sleep specialist most pulmonologist also do sleep

## 2024-02-21 NOTE — TELEPHONE ENCOUNTER
----- Message from Brenda Frankel sent at 2/21/2024  1:37 PM EST -----  Subject: Referral Request    Reason for referral request? patient needs referral for pulmonology, as   well as sleep specialists   Provider patient wants to be referred to(if known):     Provider Phone Number(if known):[phone]    Additional Information for Provider?   ---------------------------------------------------------------------------  --------------  CALL BACK INFO    [phone]; OK to leave message on voicemail  ---------------------------------------------------------------------------  --------------

## 2024-03-19 ENCOUNTER — TELEMEDICINE (OUTPATIENT)
Dept: FAMILY MEDICINE CLINIC | Age: 54
End: 2024-03-19
Payer: COMMERCIAL

## 2024-03-19 DIAGNOSIS — G25.81 RESTLESS LEG SYNDROME: Primary | ICD-10-CM

## 2024-03-19 DIAGNOSIS — R05.8 DRY COUGH: ICD-10-CM

## 2024-03-19 PROCEDURE — 99214 OFFICE O/P EST MOD 30 MIN: CPT | Performed by: FAMILY MEDICINE

## 2024-03-19 RX ORDER — ROPINIROLE 2 MG/1
2 TABLET, FILM COATED ORAL NIGHTLY
Qty: 30 TABLET | Refills: 3 | Status: SHIPPED | OUTPATIENT
Start: 2024-03-19

## 2024-04-08 RX ORDER — PANTOPRAZOLE SODIUM 40 MG/1
40 TABLET, DELAYED RELEASE ORAL DAILY
Qty: 30 TABLET | Refills: 3 | Status: SHIPPED | OUTPATIENT
Start: 2024-04-08

## 2024-04-08 NOTE — TELEPHONE ENCOUNTER
Car Brothers is calling to request a refill on the following medication(s):    Last Visit Date (If Applicable):  3/19/2024    Next Visit Date:    Visit date not found    Medication Request:  Requested Prescriptions     Pending Prescriptions Disp Refills    pantoprazole (PROTONIX) 40 MG tablet 30 tablet 3     Sig: Take 1 tablet by mouth daily

## 2024-06-06 ENCOUNTER — PATIENT MESSAGE (OUTPATIENT)
Dept: FAMILY MEDICINE CLINIC | Age: 54
End: 2024-06-06

## 2024-06-06 RX ORDER — FOLIC ACID 1 MG/1
1 TABLET ORAL DAILY
Qty: 30 TABLET | Refills: 3 | Status: SHIPPED | OUTPATIENT
Start: 2024-06-06

## 2024-06-06 NOTE — TELEPHONE ENCOUNTER
Car Brothers is calling to request a refill on the following medication(s):    Last Visit Date (If Applicable):  3/19/2024    Next Visit Date:    Visit date not found    Medication Request:  Requested Prescriptions     Pending Prescriptions Disp Refills    folic acid (FOLVITE) 1 MG tablet 30 tablet 3     Sig: Take 1 tablet by mouth daily

## 2024-06-19 RX ORDER — FLUTICASONE FUROATE AND VILANTEROL TRIFENATATE 200; 25 UG/1; UG/1
POWDER RESPIRATORY (INHALATION)
Qty: 1 EACH | Refills: 0 | Status: SHIPPED | OUTPATIENT
Start: 2024-06-19

## 2024-06-19 RX ORDER — FLUTICASONE FUROATE AND VILANTEROL TRIFENATATE 200; 25 UG/1; UG/1
POWDER RESPIRATORY (INHALATION)
COMMUNITY
End: 2024-06-19 | Stop reason: SDUPTHER

## 2024-06-28 ENCOUNTER — TELEPHONE (OUTPATIENT)
Dept: FAMILY MEDICINE CLINIC | Age: 54
End: 2024-06-28

## 2024-06-30 RX ORDER — AZITHROMYCIN 500 MG/1
500 TABLET, FILM COATED ORAL DAILY
Qty: 1 PACKET | Refills: 0 | Status: SHIPPED | OUTPATIENT
Start: 2024-06-30 | End: 2024-07-03

## 2024-06-30 RX ORDER — BENZONATATE 200 MG/1
200 CAPSULE ORAL 3 TIMES DAILY PRN
Qty: 30 CAPSULE | Refills: 0 | Status: SHIPPED | OUTPATIENT
Start: 2024-06-30 | End: 2024-07-07

## 2024-07-01 DIAGNOSIS — D50.9 IRON DEFICIENCY ANEMIA, UNSPECIFIED IRON DEFICIENCY ANEMIA TYPE: Primary | ICD-10-CM

## 2024-07-01 NOTE — TELEPHONE ENCOUNTER
Patient called in stating that you called him in some tessalon perls over the weekend he cannot take this medication and he is wanting to know if it something else you can call in for him    Please advise

## 2024-07-03 ENCOUNTER — HOSPITAL ENCOUNTER (OUTPATIENT)
Age: 54
Discharge: HOME | End: 2024-07-03
Payer: COMMERCIAL

## 2024-07-03 DIAGNOSIS — E66.01 MORBID OBESITY (HCC): ICD-10-CM

## 2024-07-03 DIAGNOSIS — K76.6 PORTAL VENOUS HYPERTENSION (HCC): ICD-10-CM

## 2024-07-03 DIAGNOSIS — D50.9 IRON DEFICIENCY ANEMIA, UNSPECIFIED IRON DEFICIENCY ANEMIA TYPE: ICD-10-CM

## 2024-07-03 LAB
ANION GAP SERPL CALCULATED.3IONS-SCNC: 11 MMOL/L (ref 9–17)
BASOPHILS # BLD: 0.04 K/UL (ref 0–0.2)
BASOPHILS NFR BLD: 0 % (ref 0–2)
BUN SERPL-MCNC: 13 MG/DL (ref 6–20)
BUN/CREAT SERPL: 14 (ref 9–20)
CALCIUM SERPL-MCNC: 9.7 MG/DL (ref 8.6–10.4)
CHLORIDE SERPL-SCNC: 92 MMOL/L (ref 98–107)
CO2 SERPL-SCNC: 29 MMOL/L (ref 20–31)
CREAT SERPL-MCNC: 0.9 MG/DL (ref 0.7–1.2)
EOSINOPHIL # BLD: 0.13 K/UL (ref 0–0.44)
EOSINOPHILS RELATIVE PERCENT: 1 % (ref 1–4)
ERYTHROCYTE [DISTWIDTH] IN BLOOD BY AUTOMATED COUNT: 12.9 % (ref 11.8–14.4)
FERRITIN SERPL-MCNC: 202 NG/ML (ref 30–400)
GFR, ESTIMATED: >90 ML/MIN/1.73M2
GLUCOSE SERPL-MCNC: 125 MG/DL (ref 70–99)
HCT VFR BLD AUTO: 39.8 % (ref 40.7–50.3)
HGB BLD-MCNC: 13.3 G/DL (ref 13–17)
IMM GRANULOCYTES # BLD AUTO: 0.08 K/UL (ref 0–0.3)
IMM GRANULOCYTES NFR BLD: 1 %
IRON SATN MFR SERPL: 30 % (ref 20–55)
IRON SERPL-MCNC: 94 UG/DL (ref 61–157)
LYMPHOCYTES NFR BLD: 0.94 K/UL (ref 1.1–3.7)
LYMPHOCYTES RELATIVE PERCENT: 9 % (ref 24–43)
MCH RBC QN AUTO: 33 PG (ref 25.2–33.5)
MCHC RBC AUTO-ENTMCNC: 33.4 G/DL (ref 28.4–34.8)
MCV RBC AUTO: 98.8 FL (ref 82.6–102.9)
MONOCYTES NFR BLD: 0.62 K/UL (ref 0.1–1.2)
MONOCYTES NFR BLD: 6 % (ref 3–12)
NEUTROPHILS NFR BLD: 83 % (ref 36–65)
NEUTS SEG NFR BLD: 8.78 K/UL (ref 1.5–8.1)
NRBC BLD-RTO: 0 PER 100 WBC
PLATELET # BLD AUTO: 136 K/UL (ref 138–453)
PMV BLD AUTO: 9.8 FL (ref 8.1–13.5)
POTASSIUM SERPL-SCNC: 3.9 MMOL/L (ref 3.7–5.3)
RBC # BLD AUTO: 4.03 M/UL (ref 4.21–5.77)
SODIUM SERPL-SCNC: 132 MMOL/L (ref 135–144)
TIBC SERPL-MCNC: 318 UG/DL (ref 250–450)
UNSATURATED IRON BINDING CAPACITY: 224 UG/DL (ref 112–347)
WBC OTHER # BLD: 10.6 K/UL (ref 3.5–11.3)

## 2024-07-03 PROCEDURE — 80048 BASIC METABOLIC PNL TOTAL CA: CPT

## 2024-07-03 PROCEDURE — 82728 ASSAY OF FERRITIN: CPT

## 2024-07-03 PROCEDURE — 83540 ASSAY OF IRON: CPT

## 2024-07-03 PROCEDURE — 85025 COMPLETE CBC W/AUTO DIFF WBC: CPT

## 2024-07-03 PROCEDURE — 36415 COLL VENOUS BLD VENIPUNCTURE: CPT

## 2024-07-03 PROCEDURE — 83550 IRON BINDING TEST: CPT

## 2024-07-08 ENCOUNTER — TELEMEDICINE (OUTPATIENT)
Dept: ONCOLOGY | Age: 54
End: 2024-07-08
Payer: COMMERCIAL

## 2024-07-08 ENCOUNTER — TELEPHONE (OUTPATIENT)
Dept: ONCOLOGY | Age: 54
End: 2024-07-08

## 2024-07-08 DIAGNOSIS — K76.6 PORTAL VENOUS HYPERTENSION (HCC): ICD-10-CM

## 2024-07-08 DIAGNOSIS — D50.9 IRON DEFICIENCY ANEMIA, UNSPECIFIED IRON DEFICIENCY ANEMIA TYPE: Primary | ICD-10-CM

## 2024-07-08 PROCEDURE — 99214 OFFICE O/P EST MOD 30 MIN: CPT | Performed by: INTERNAL MEDICINE

## 2024-07-08 NOTE — TELEPHONE ENCOUNTER
KVNG HERE FOR VV  Return to clinic in 6 months with labs 1 week prior  LABS ORDERED: CBC FERRITIN IRON & TIBC VIT B12 & FOLATE   WRITER WILL CALL PT TO SCHEDULE ONCE CALENDAR IS MADE   LABS PRINTED AND MAILED TO PT   AVS PRINTED AND MAILED TO PT

## 2024-07-08 NOTE — PROGRESS NOTES
Car Brothers                                                                                                                  7/8/2024  MRN:   [id number]  YOB: 1970  PCP:                           Kimmy Black DO  Referring Physician: No ref. provider found  Treating Physician Name: BLAZE BALDERAS MD      Reason for visit:  Patient seen in clinic via virtual visit.  Discussed results of lab workup    Current problems:  Anemia  Iron deficiency  Pulmonary hypertension with right-sided heart failure  Morbid obesity  Obstructive sleep apnea  CKD  History of alcohol abuse    Interval history:  Patient seen in clinic via virtual visit.  Patient has been taking oral iron once daily.  Most recent lab workup shows hemoglobin normalized.  Also shows adequate iron stores.  He has a shoulder surgery coming up in August.    During this visit patient's allergy, social, medical, surgical history and medications were reviewed and updated.    Summary of Case/History:    Car Brothers a 53 y.o.male is a patient with anemia presents to the clinic to establish care and for further work-up and evaluation.  Patient has multiple comorbidities as mentioned above.  Patient was recently admitted to the hospital back in May 2023.  At that time patient was diagnosed with acute respiratory failure and acute kidney injury on CKD.  Patient does have a history of alcohol abuse.  Patient last hemoglobin noted was 7.9 platelet count 97,000 and WBC count of 6.5.  Patient ANC was 4.7.  Patient iron studies showed iron saturation of 10%.  Patient's immunofixation studies were negative.  Patient was also recently seen in the ER at [facility] for testicular pain ultrasound of scrotum showed bilateral varicoceles patient repeat B12 level came back at 296 CBC showed improvement of hemoglobin to 9.8 and improvement of platelet count of 147.  However iron saturation was 7 to 8% patient inflammatory markers were positive.

## 2024-07-11 ENCOUNTER — OFFICE VISIT (OUTPATIENT)
Dept: PULMONOLOGY | Age: 54
End: 2024-07-11
Payer: COMMERCIAL

## 2024-07-11 VITALS
BODY MASS INDEX: 42.66 KG/M2 | HEART RATE: 89 BPM | SYSTOLIC BLOOD PRESSURE: 107 MMHG | WEIGHT: 315 LBS | TEMPERATURE: 98.3 F | DIASTOLIC BLOOD PRESSURE: 70 MMHG | RESPIRATION RATE: 13 BRPM | OXYGEN SATURATION: 97 % | HEIGHT: 72 IN

## 2024-07-11 DIAGNOSIS — G47.33 OSA (OBSTRUCTIVE SLEEP APNEA): ICD-10-CM

## 2024-07-11 DIAGNOSIS — J45.40 MODERATE PERSISTENT ASTHMA WITHOUT COMPLICATION: ICD-10-CM

## 2024-07-11 DIAGNOSIS — G25.81 RLS (RESTLESS LEGS SYNDROME): ICD-10-CM

## 2024-07-11 DIAGNOSIS — J45.30 MILD PERSISTENT ASTHMA WITHOUT COMPLICATION: Primary | ICD-10-CM

## 2024-07-11 DIAGNOSIS — J30.9 ALLERGIC RHINITIS, UNSPECIFIED SEASONALITY, UNSPECIFIED TRIGGER: ICD-10-CM

## 2024-07-11 PROCEDURE — 3078F DIAST BP <80 MM HG: CPT | Performed by: INTERNAL MEDICINE

## 2024-07-11 PROCEDURE — 99204 OFFICE O/P NEW MOD 45 MIN: CPT | Performed by: INTERNAL MEDICINE

## 2024-07-11 PROCEDURE — 3074F SYST BP LT 130 MM HG: CPT | Performed by: INTERNAL MEDICINE

## 2024-07-11 RX ORDER — GABAPENTIN 300 MG/1
300 CAPSULE ORAL
COMMUNITY
Start: 2024-06-24

## 2024-07-11 RX ORDER — FLUTICASONE FUROATE AND VILANTEROL TRIFENATATE 200; 25 UG/1; UG/1
POWDER RESPIRATORY (INHALATION)
Qty: 30 EACH | Refills: 11 | Status: SHIPPED | OUTPATIENT
Start: 2024-07-11

## 2024-07-11 RX ORDER — ROPINIROLE 1 MG/1
1.5 TABLET, FILM COATED ORAL NIGHTLY
Qty: 45 TABLET | Refills: 5 | Status: SHIPPED | OUTPATIENT
Start: 2024-07-11

## 2024-07-11 RX ORDER — ALBUTEROL SULFATE 90 UG/1
2 AEROSOL, METERED RESPIRATORY (INHALATION) 4 TIMES DAILY
Qty: 18 G | Refills: 5 | Status: SHIPPED | OUTPATIENT
Start: 2024-07-11

## 2024-07-11 RX ORDER — MONTELUKAST SODIUM 10 MG/1
10 TABLET ORAL NIGHTLY
Qty: 30 TABLET | Refills: 11 | Status: SHIPPED | OUTPATIENT
Start: 2024-07-11

## 2024-07-11 NOTE — PROGRESS NOTES
OUTPATIENT PULMONARY CONSULT NOTE      Patient:  Car Brothers  MRN: [id number]    Consulting Physician: Kimmy Black DO  Reason for Consult: Obstructive sleep apnea/restless leg syndrome/obesity/asthma/allergic rhinitis  Primacy Care Physician: Kimmy Black DO    HISTORY OF PRESENT ILLNESS:   The patient is a 53 y.o. male patient is referred here for sleep apnea//cystlike syndrome/asthma.    According to patient he was being followed by Dr. Rodriguez sleep specialist for some time and the last time he was seen by him was 8 9 months ago per patient he has history of obstructive sleep apnea and apparently had a sleep study done in 2023 [place] sleep study was done but patient was not interested in using CPAP and according patient he told them that he would not use CPAP anything that he is sleeping okay.  He does have history of restless leg syndrome he was on Requip 4 mg a day and was managed by Dr. Rodriguez and apparently he was having augmentation he started having symptoms in his upper extremities and during the daytime and so Dr. Rodriguez started weaning down Requip and the plan was to take him off Requip and he was also taking Neurontin/gabapentin and his symptoms were better he was down to 2 mg of Requip and since then apparently he had not seen Dr. Rodriguez he is not having symptoms during the daytime and he is not having symptoms in upper extremities and his symptoms are controlled.  He does have history of asthma he does have a history of intermittent cough he take Breo once daily he does not usually require albuterol except when there is weather changes heat and humidity.  He denies daily cough or daily wheezing wheezing is occasional.  He denies sputum production change in the color of the sputum and he is able to do his regular activities denies shortness of breath on regular activities.  He does have history of allergic rhinitis.  According to patient he is also on Singulair for allergic

## 2024-07-17 RX ORDER — ROPINIROLE 1 MG/1
2 TABLET, FILM COATED ORAL NIGHTLY
Qty: 60 TABLET | Refills: 5 | Status: SHIPPED | OUTPATIENT
Start: 2024-07-17

## 2024-07-17 NOTE — TELEPHONE ENCOUNTER
Patient called and stated that since his visit on 7/11/24 that he tried only doing the 1.5mg of requip and he keeps waking up at night with restless leg.  He took 2mg the past couple of nights and has been fine ans was wanting you you put it back up to 2mg.  If you agree please sign the pended script for 2mg nightly

## 2024-07-24 ENCOUNTER — TELEPHONE (OUTPATIENT)
Dept: FAMILY MEDICINE CLINIC | Age: 54
End: 2024-07-24

## 2024-07-24 NOTE — TELEPHONE ENCOUNTER
----- Message from Chaim Garcia sent at 7/24/2024  3:23 PM EDT -----  Regarding: ECC Message to Provider  ECC Message to Provider    Relationship to Patient: Self     Additional Information: The patient would like to have an anti-biotic from her primary care provider from his dental cleaning in his teeth to avoid infection.   --------------------------------------------------------------------------------------------------------------------------    Call Back Information: OK to leave message on voicemail  Preferred Call Back Number: Phone [phone]

## 2024-08-12 LAB
ESTIMATED AVERAGE GLUCOSE: NORMAL
HBA1C MFR BLD: 6.5 %

## 2024-08-20 RX ORDER — FLUTICASONE FUROATE AND VILANTEROL TRIFENATATE 200; 25 UG/1; UG/1
POWDER RESPIRATORY (INHALATION)
Qty: 3 EACH | Refills: 3 | Status: SHIPPED | OUTPATIENT
Start: 2024-08-20

## 2024-08-20 NOTE — TELEPHONE ENCOUNTER
Patient called and stated that he applied for assistance with Breo and he is needing a signed printed script for a 90 day supply with 3 refills.  Patient was last seen as a new patient 7/11/24.  I did pend the script for your approval if you agree please sign in office because it has to print

## 2024-08-21 NOTE — TELEPHONE ENCOUNTER
The script is in Dr Belle's box to be signed.  I spoke with patient and told him I should have in the mail by Monday for him.

## 2024-08-26 RX ORDER — FLUTICASONE FUROATE AND VILANTEROL TRIFENATATE 200; 25 UG/1; UG/1
POWDER RESPIRATORY (INHALATION)
Qty: 60 EACH | Refills: 0 | Status: SHIPPED | OUTPATIENT
Start: 2024-08-26

## 2024-08-26 NOTE — TELEPHONE ENCOUNTER
Car Brothers is calling to request a refill on the following medication(s):    Last Visit Date (If Applicable):  3/19/2024    Next Visit Date:    1/2/2025    Medication Request:  Requested Prescriptions     Pending Prescriptions Disp Refills    BREO ELLIPTA 200-25 MCG/ACT AEPB inhaler [Pharmacy Med Name: BREO ELLIPTA 200-25 MCG INHALR] 60 each 0     Sig: INHALE 1 DOSE BY MOUTH DAILY

## 2024-09-17 ENCOUNTER — TELEPHONE (OUTPATIENT)
Dept: PULMONOLOGY | Age: 54
End: 2024-09-17

## 2024-09-19 ENCOUNTER — TELEPHONE (OUTPATIENT)
Dept: PULMONOLOGY | Age: 54
End: 2024-09-19

## 2024-09-19 RX ORDER — FLUTICASONE FUROATE AND VILANTEROL 200; 25 UG/1; UG/1
1 POWDER RESPIRATORY (INHALATION) DAILY
Qty: 1 EACH | Refills: 5 | Status: SHIPPED | OUTPATIENT
Start: 2024-09-19

## 2024-09-23 ENCOUNTER — TELEPHONE (OUTPATIENT)
Dept: FAMILY MEDICINE CLINIC | Age: 54
End: 2024-09-23

## 2024-10-07 RX ORDER — FOLIC ACID 1 MG/1
1 TABLET ORAL DAILY
Qty: 30 TABLET | Refills: 11 | Status: SHIPPED | OUTPATIENT
Start: 2024-10-07

## 2024-10-07 NOTE — TELEPHONE ENCOUNTER
Car Brothers is calling to request a refill on the following medication(s):    Last Visit Date (If Applicable):  3/19/2024    Next Visit Date:    1/2/2025    Medication Request:  Requested Prescriptions     Pending Prescriptions Disp Refills    folic acid (FOLVITE) 1 MG tablet [Pharmacy Med Name: FOLIC ACID 1 MG TABLET] 30 tablet 3     Sig: TAKE 1 TABLET BY MOUTH DAILY           \

## 2024-10-18 ENCOUNTER — TELEPHONE (OUTPATIENT)
Dept: PULMONOLOGY | Age: 54
End: 2024-10-18

## 2024-10-18 RX ORDER — GABAPENTIN 600 MG/1
600 TABLET ORAL 2 TIMES DAILY
Qty: 60 TABLET | Refills: 3 | Status: CANCELLED | OUTPATIENT
Start: 2024-10-18 | End: 2025-02-15

## 2024-10-18 NOTE — TELEPHONE ENCOUNTER
Patient called, states the Gabapentin is not doing anything for him. He is currently on 1200 mg a day, at night. He is going to stop taking it and follow up with Neurology.     He wanted it documented for you.

## 2025-01-17 ENCOUNTER — TELEPHONE (OUTPATIENT)
Dept: PULMONOLOGY | Age: 55
End: 2025-01-17

## 2025-01-17 NOTE — TELEPHONE ENCOUNTER
Pt is scheduled for a f/u visit with Dr. Belle on 1/23/25 for asthma and RLS. Pt recently had COVID, documentation in chart. Pt changed his appt to a VV. Writer called pt to ask if he is able to come into the office so Dr. Belle can listen to his lungs. Pt was coughing during the phone call. He stated he is trying to avoid going out in the cold. He was informed writer would check with Dr. eBlle and call him on Monday, 1/20.   Dr Belle- please advise if VV is ok or if you would like to see the pt in office. Thank you.

## 2025-01-20 RX ORDER — GABAPENTIN 600 MG/1
600 TABLET ORAL 2 TIMES DAILY
Qty: 60 TABLET | Refills: 3 | Status: SHIPPED | OUTPATIENT
Start: 2025-01-20 | End: 2025-05-20

## 2025-01-20 RX ORDER — GABAPENTIN 600 MG/1
600 TABLET ORAL 2 TIMES DAILY
Qty: 60 TABLET | Refills: 3 | OUTPATIENT
Start: 2025-01-20 | End: 2025-05-20

## 2025-01-20 NOTE — TELEPHONE ENCOUNTER
LMOM informing pt Dr. Belle ok'd VV if he is unable to come into the office. Pt was informed visit may be at the end of clinic, around 4:30ish, and to please call the office if this is a problem for him.

## 2025-01-20 NOTE — TELEPHONE ENCOUNTER
Last Visit: 7/11/24  Next Visit: 1/23/25    Writer received faxed refill request for Gabapentin from [facility]. Per 10/18/24 telephone encounter, patient was discontinuing Gabapentin as it was not helping him. Writer pended and refused medication. Please advise if you would like to make any changes.

## 2025-01-26 ENCOUNTER — HOSPITAL ENCOUNTER (OUTPATIENT)
Facility: CLINIC | Age: 55
Discharge: HOME | End: 2025-01-26

## 2025-01-26 DIAGNOSIS — K76.6 PORTAL VENOUS HYPERTENSION (HCC): ICD-10-CM

## 2025-01-26 DIAGNOSIS — D50.9 IRON DEFICIENCY ANEMIA, UNSPECIFIED IRON DEFICIENCY ANEMIA TYPE: ICD-10-CM

## 2025-01-26 LAB
BASOPHILS # BLD: 0.04 K/UL (ref 0–0.2)
BASOPHILS NFR BLD: 0 % (ref 0–2)
EOSINOPHIL # BLD: 0.12 K/UL (ref 0–0.44)
EOSINOPHILS RELATIVE PERCENT: 1 % (ref 1–4)
ERYTHROCYTE [DISTWIDTH] IN BLOOD BY AUTOMATED COUNT: 13.6 % (ref 11.8–14.4)
FERRITIN SERPL-MCNC: 86 NG/ML (ref 30–400)
FOLATE SERPL-MCNC: 17.4 NG/ML (ref 4.8–24.2)
HCT VFR BLD AUTO: 36.7 % (ref 40.7–50.3)
HGB BLD-MCNC: 11.5 G/DL (ref 13–17)
IMM GRANULOCYTES # BLD AUTO: 0.08 K/UL (ref 0–0.3)
IMM GRANULOCYTES NFR BLD: 1 %
IRON SATN MFR SERPL: 21 % (ref 20–55)
IRON SERPL-MCNC: 72 UG/DL (ref 61–157)
LYMPHOCYTES NFR BLD: 0.85 K/UL (ref 1.1–3.7)
LYMPHOCYTES RELATIVE PERCENT: 8 % (ref 24–43)
MCH RBC QN AUTO: 29.9 PG (ref 25.2–33.5)
MCHC RBC AUTO-ENTMCNC: 31.3 G/DL (ref 28.4–34.8)
MCV RBC AUTO: 95.6 FL (ref 82.6–102.9)
MONOCYTES NFR BLD: 0.76 K/UL (ref 0.1–1.2)
MONOCYTES NFR BLD: 7 % (ref 3–12)
NEUTROPHILS NFR BLD: 83 % (ref 36–65)
NEUTS SEG NFR BLD: 8.38 K/UL (ref 1.5–8.1)
NRBC BLD-RTO: 0 PER 100 WBC
PLATELET # BLD AUTO: 111 K/UL (ref 138–453)
PMV BLD AUTO: 12.2 FL (ref 8.1–13.5)
RBC # BLD AUTO: 3.84 M/UL (ref 4.21–5.77)
TIBC SERPL-MCNC: 349 UG/DL (ref 250–450)
UNSATURATED IRON BINDING CAPACITY: 277 UG/DL (ref 112–347)
VIT B12 SERPL-MCNC: >2000 PG/ML (ref 232–1245)
WBC OTHER # BLD: 10.2 K/UL (ref 3.5–11.3)

## 2025-01-27 ENCOUNTER — TELEMEDICINE (OUTPATIENT)
Dept: ONCOLOGY | Age: 55
End: 2025-01-27
Payer: COMMERCIAL

## 2025-01-27 DIAGNOSIS — D69.6 THROMBOCYTOPENIA (HCC): ICD-10-CM

## 2025-01-27 DIAGNOSIS — D50.9 IRON DEFICIENCY ANEMIA, UNSPECIFIED IRON DEFICIENCY ANEMIA TYPE: Primary | ICD-10-CM

## 2025-01-27 DIAGNOSIS — K76.6 PORTAL VENOUS HYPERTENSION (HCC): ICD-10-CM

## 2025-01-27 PROCEDURE — 99213 OFFICE O/P EST LOW 20 MIN: CPT | Performed by: INTERNAL MEDICINE

## 2025-01-27 NOTE — PROGRESS NOTES
Car Brothers                                                                                                                  1/27/2025  MRN:   [id number]  YOB: 1970  PCP:                           Kimmy Black DO  Referring Physician: No ref. provider found  Treating Physician Name: BLAZE BALDERAS MD      Reason for visit:  Patient seen in clinic via virtual visit  Discussed results of lab workup    Current problems:  Anemia  Iron deficiency  Pulmonary hypertension with right-sided heart failure  Morbid obesity  Obstructive sleep apnea  CKD  History of alcohol abuse    Interval history:  Patient seen in clinic via virtual visit.  Patient has been taking oral iron once daily.  Lab workup shows adequate iron stores.  Hemoglobin on the lower side of normal.  Patient underwent shoulder surgery since last office visit.  During this visit patient's allergy, social, medical, surgical history and medications were reviewed and updated.    Summary of Case/History:    Car Brothers a 54 y.o.male is a patient with anemia presents to the clinic to establish care and for further work-up and evaluation.  Patient has multiple comorbidities as mentioned above.  Patient was recently admitted to the hospital back in May 2023.  At that time patient was diagnosed with acute respiratory failure and acute kidney injury on CKD.  Patient does have a history of alcohol abuse.  Patient last hemoglobin noted was 7.9 platelet count 97,000 and WBC count of 6.5.  Patient ANC was 4.7.  Patient iron studies showed iron saturation of 10%.  Patient's immunofixation studies were negative.  Patient was also recently seen in the ER at [facility] for testicular pain ultrasound of scrotum showed bilateral varicoceles patient repeat B12 level came back at 296 CBC showed improvement of hemoglobin to 9.8 and improvement of platelet count of 147.  However iron saturation was 7 to 8% patient inflammatory markers were positive.

## 2025-01-31 ENCOUNTER — TELEMEDICINE (OUTPATIENT)
Dept: PULMONOLOGY | Age: 55
End: 2025-01-31

## 2025-01-31 DIAGNOSIS — J30.9 ALLERGIC RHINITIS, UNSPECIFIED SEASONALITY, UNSPECIFIED TRIGGER: ICD-10-CM

## 2025-01-31 DIAGNOSIS — G47.33 OSA (OBSTRUCTIVE SLEEP APNEA): Primary | ICD-10-CM

## 2025-01-31 DIAGNOSIS — J45.40 MODERATE PERSISTENT ASTHMA WITHOUT COMPLICATION: ICD-10-CM

## 2025-01-31 DIAGNOSIS — G25.81 RLS (RESTLESS LEGS SYNDROME): ICD-10-CM

## 2025-01-31 RX ORDER — ROPINIROLE 4 MG/1
4 TABLET, FILM COATED ORAL NIGHTLY
Qty: 30 TABLET | Refills: 5 | Status: SHIPPED | OUTPATIENT
Start: 2025-01-31

## 2025-01-31 RX ORDER — GUAIFENESIN 600 MG/1
600 TABLET, EXTENDED RELEASE ORAL 2 TIMES DAILY
Qty: 30 TABLET | Refills: 1 | Status: SHIPPED | OUTPATIENT
Start: 2025-01-31 | End: 2025-03-02

## 2025-01-31 NOTE — PROGRESS NOTES
received the influenza vaccine and plans to discuss the COVID-19 vaccine with his primary care physician during an upcoming appointment.    Initial office visit and evaluation on 07/11/2024  According to patient he was being followed by Dr. Rodriguez sleep specialist for some time and the last time he was seen by him was 8 9 months ago per patient he has history of obstructive sleep apnea and apparently had a sleep study done in [place] sleep study was done but patient was not interested in using CPAP and according patient he told them that he would not use CPAP anything that he is sleeping okay.  He does have history of restless leg syndrome he was on Requip 4 mg a day and was managed by Dr. Rodriguez and apparently he was having augmentation he started having symptoms in his upper extremities and during the daytime and so Dr. Rodriguez started weaning down Requip and the plan was to take him off Requip and he was also taking Neurontin/gabapentin and his symptoms were better he was down to 2 mg of Requip and since then apparently he had not seen Dr. Rodriguez he is not having symptoms during the daytime and he is not having symptoms in upper extremities and his symptoms are controlled.  He does have history of asthma he does have a history of intermittent cough he take Breo once daily he does not usually require albuterol except when there is weather changes heat and humidity.  He denies daily cough or daily wheezing wheezing is occasional.  He denies sputum production change in the color of the sputum and he is able to do his regular activities denies shortness of breath on regular activities.  He does have history of allergic rhinitis.  According to patient he is also on Singulair for allergic rhinitis and asthma and his symptoms are controlled denies daily postnasal drip nasal congestion but seasonal variability exist.      Past Medical History:        Diagnosis Date    Alcohol abuse 5/2/2023

## 2025-02-03 ENCOUNTER — TELEPHONE (OUTPATIENT)
Dept: PULMONOLOGY | Age: 55
End: 2025-02-03

## 2025-02-03 RX ORDER — FLUTICASONE PROPIONATE 50 MCG
2 SPRAY, SUSPENSION (ML) NASAL DAILY
Qty: 16 G | Refills: 5 | Status: SHIPPED | OUTPATIENT
Start: 2025-02-03

## 2025-02-03 NOTE — TELEPHONE ENCOUNTER
Patient called today, is requesting a nasal spray hoping it will help his sinus symptoms?     Unsure what you might want to prescribe.     Pharmacy and Allergies verified.

## 2025-02-03 NOTE — TELEPHONE ENCOUNTER
Pt called the office. Reported sinus drainage. Pt stated he did start taking the Mucinex but thought at his visit with you a nasal spray was mentioned. He has taken flonase in the past. Please advise. Thank you.

## 2025-02-04 RX ORDER — PANTOPRAZOLE SODIUM 40 MG/1
40 TABLET, DELAYED RELEASE ORAL DAILY
Qty: 30 TABLET | Refills: 3 | OUTPATIENT
Start: 2025-02-04

## 2025-02-06 RX ORDER — PANTOPRAZOLE SODIUM 40 MG/1
40 TABLET, DELAYED RELEASE ORAL DAILY
Qty: 30 TABLET | Refills: 3 | Status: SHIPPED | OUTPATIENT
Start: 2025-02-06

## 2025-04-23 ENCOUNTER — TELEPHONE (OUTPATIENT)
Dept: PULMONOLOGY | Age: 55
End: 2025-04-23

## 2025-04-23 NOTE — TELEPHONE ENCOUNTER
Patient contacted the office stating that he is on Ropinirole due to restless legs, and he is finding that he is now having restlessness in his arms. Patient states that the restlessness is keeping him up at night. He reports being able to fall asleep for about an hour and then he wakes up and has difficulty going back to sleep due to it. Please advise.

## 2025-04-24 NOTE — TELEPHONE ENCOUNTER
Per Dr. Belle,   \"  Patient apparently according to my last note is 3 mg and sometimes he take 4 mg of ropinirole just make sure that he take 4 mg of ropinirole.  When I saw him last time he was telling me that he was weaning off gabapentin as he did not think that it was effective and he was in contact with neurology/primary physician if he has weaned off gabapentin he most likely have to go back gabapentin and he can contact neurologist or primary physician start the gabapentin again.  Patient also have history of iron deficiency anemia and he was seen by hematologist and his iron stores in January were better but if his iron stores are going down that will definitely cause increased symptoms restless leg syndrome and he should contact hematology so they can check iron/iron stores as according to last hematology note he was advised to continue with iron and IV iron in the past\"       Writer contacted patient and discussed with him. Patient is taking 4mg Ropinirole. Patient reports not taking Gabapentin at all within over a week since his prescription ran out. Patient also stated he was wondering if he should be back on iron.     Patient was advised per Dr. Belle's recommendation to contact the hematologist as well as his neurologist or PCP. Patient verbalized understanding and thanked writer for following up.

## 2025-06-09 RX ORDER — GLIMEPIRIDE 4 MG/1
4 TABLET ORAL
Qty: 100 TABLET | Refills: 1 | Status: SHIPPED | OUTPATIENT
Start: 2025-06-09

## 2025-07-21 RX ORDER — FLUTICASONE FUROATE AND VILANTEROL 200; 25 UG/1; UG/1
1 POWDER RESPIRATORY (INHALATION)
Qty: 1 EACH | Refills: 0 | Status: CANCELLED | OUTPATIENT
Start: 2025-07-21

## 2025-07-21 RX ORDER — ROPINIROLE 4 MG/1
4 TABLET, FILM COATED ORAL NIGHTLY
Qty: 30 TABLET | Refills: 5 | Status: SHIPPED | OUTPATIENT
Start: 2025-07-21

## 2025-07-21 RX ORDER — FLUTICASONE FUROATE AND VILANTEROL 200; 25 UG/1; UG/1
1 POWDER RESPIRATORY (INHALATION) DAILY
Qty: 1 EACH | Refills: 2 | Status: SHIPPED | OUTPATIENT
Start: 2025-07-21

## 2025-07-21 NOTE — TELEPHONE ENCOUNTER
Last appointment: 1/31/25  Next appointment: 8/14/25  Last fill date: 1/31/25    Requested Prescriptions     Pending Prescriptions Disp Refills    rOPINIRole (REQUIP) 4 MG tablet 30 tablet 5     Sig: Take 1 tablet by mouth nightly

## 2025-07-21 NOTE — TELEPHONE ENCOUNTER
Patient receives Breo through [facility]. Patient needs order printed and hand signed to be faxed to [phone]. Writer pended prescription to print, please sign when you are in the office and leave at my desk. Thanks.

## 2025-07-30 RX ORDER — ALBUTEROL SULFATE 90 UG/1
INHALANT RESPIRATORY (INHALATION)
Qty: 18 G | Refills: 5 | Status: SHIPPED | OUTPATIENT
Start: 2025-07-30

## 2025-07-30 NOTE — TELEPHONE ENCOUNTER
Last appointment: 1/31/25  Next appointment: 8/14/25  Last fill date: 7/11/24    Requested Prescriptions     Pending Prescriptions Disp Refills    albuterol sulfate HFA (PROVENTIL;VENTOLIN;PROAIR) 108 (90 Base) MCG/ACT inhaler [Pharmacy Med Name: ALBUTEROL HFA 90 MCG INHALER] 18 g 5     Sig: INHALE 2 PUFFS INTO THE LUNGS FOUR TIMES A DAY       Per last dictation, patient is currently taking the pended medication(s). Please sign if agreeable. Thank you.

## 2025-08-11 RX ORDER — FOLIC ACID 1 MG/1
1 TABLET ORAL DAILY
Qty: 90 TABLET | Refills: 11 | Status: SHIPPED | OUTPATIENT
Start: 2025-08-11